# Patient Record
Sex: MALE | Race: WHITE | Employment: OTHER | URBAN - METROPOLITAN AREA
[De-identification: names, ages, dates, MRNs, and addresses within clinical notes are randomized per-mention and may not be internally consistent; named-entity substitution may affect disease eponyms.]

---

## 2017-03-06 ENCOUNTER — OFFICE VISIT (OUTPATIENT)
Dept: PULMONOLOGY | Age: 62
End: 2017-03-06

## 2017-03-06 VITALS
DIASTOLIC BLOOD PRESSURE: 62 MMHG | TEMPERATURE: 97.5 F | HEART RATE: 74 BPM | HEIGHT: 69 IN | WEIGHT: 213.4 LBS | RESPIRATION RATE: 18 BRPM | SYSTOLIC BLOOD PRESSURE: 110 MMHG | BODY MASS INDEX: 31.61 KG/M2 | OXYGEN SATURATION: 95 %

## 2017-03-06 DIAGNOSIS — G47.33 MODERATE OBSTRUCTIVE SLEEP APNEA: Primary | ICD-10-CM

## 2017-03-06 DIAGNOSIS — J44.1 COPD WITH ACUTE EXACERBATION (HCC): ICD-10-CM

## 2017-03-06 DIAGNOSIS — Z12.9 CANCER SCREENING: ICD-10-CM

## 2017-03-06 DIAGNOSIS — J47.9 BRONCHIECTASIS WITHOUT COMPLICATION (HCC): ICD-10-CM

## 2017-03-06 DIAGNOSIS — Z87.891 FORMER SMOKER: ICD-10-CM

## 2017-03-06 PROCEDURE — 99214 OFFICE O/P EST MOD 30 MIN: CPT | Performed by: INTERNAL MEDICINE

## 2017-03-06 RX ORDER — ALBUTEROL SULFATE 90 UG/1
2 AEROSOL, METERED RESPIRATORY (INHALATION) EVERY 6 HOURS PRN
Qty: 1 INHALER | Refills: 6 | Status: SHIPPED | OUTPATIENT
Start: 2017-03-06 | End: 2018-09-27 | Stop reason: SDUPTHER

## 2017-03-06 RX ORDER — PREDNISONE 10 MG/1
TABLET ORAL
Qty: 30 TABLET | Refills: 0 | Status: SHIPPED | OUTPATIENT
Start: 2017-03-06 | End: 2017-03-16

## 2017-03-06 RX ORDER — DOXYCYCLINE HYCLATE 100 MG
100 TABLET ORAL 2 TIMES DAILY
Qty: 10 TABLET | Refills: 0 | Status: SHIPPED | OUTPATIENT
Start: 2017-03-06 | End: 2017-03-07

## 2017-03-06 ASSESSMENT — SLEEP AND FATIGUE QUESTIONNAIRES
NECK CIRCUMFERENCE (INCHES): 17
HOW LIKELY ARE YOU TO NOD OFF OR FALL ASLEEP WHILE SITTING AND TALKING TO SOMEONE: 0
HOW LIKELY ARE YOU TO NOD OFF OR FALL ASLEEP IN A CAR, WHILE STOPPED FOR A FEW MINUTES IN TRAFFIC: 0
ESS TOTAL SCORE: 3
HOW LIKELY ARE YOU TO NOD OFF OR FALL ASLEEP WHILE SITTING QUIETLY AFTER LUNCH WITHOUT ALCOHOL: 0
HOW LIKELY ARE YOU TO NOD OFF OR FALL ASLEEP WHILE LYING DOWN TO REST IN THE AFTERNOON WHEN CIRCUMSTANCES PERMIT: 1
HOW LIKELY ARE YOU TO NOD OFF OR FALL ASLEEP WHILE SITTING AND READING: 1
HOW LIKELY ARE YOU TO NOD OFF OR FALL ASLEEP WHILE SITTING INACTIVE IN A PUBLIC PLACE: 0
HOW LIKELY ARE YOU TO NOD OFF OR FALL ASLEEP WHEN YOU ARE A PASSENGER IN A CAR FOR AN HOUR WITHOUT A BREAK: 0
HOW LIKELY ARE YOU TO NOD OFF OR FALL ASLEEP WHILE WATCHING TV: 1

## 2017-03-07 RX ORDER — AZITHROMYCIN 500 MG/1
500 TABLET, FILM COATED ORAL DAILY
Qty: 5 TABLET | Refills: 0 | Status: SHIPPED | OUTPATIENT
Start: 2017-03-07 | End: 2017-03-12

## 2017-03-09 ENCOUNTER — TELEPHONE (OUTPATIENT)
Dept: PULMONOLOGY | Age: 62
End: 2017-03-09

## 2017-03-10 ENCOUNTER — HOSPITAL ENCOUNTER (OUTPATIENT)
Dept: CT IMAGING | Age: 62
Discharge: HOME | End: 2017-03-10
Attending: INTERNAL MEDICINE | Admitting: INTERNAL MEDICINE

## 2017-03-10 DIAGNOSIS — Z87.891 FORMER SMOKER: ICD-10-CM

## 2017-03-10 DIAGNOSIS — Z12.9 CANCER SCREENING: ICD-10-CM

## 2017-03-10 DIAGNOSIS — Z87.891 PERSONAL HISTORY OF NICOTINE DEPENDENCE: ICD-10-CM

## 2017-03-17 ENCOUNTER — HOSPITAL ENCOUNTER (OUTPATIENT)
Dept: ULTRASOUND IMAGING | Age: 62
Discharge: HOME | End: 2017-03-17
Attending: FAMILY MEDICINE | Admitting: FAMILY MEDICINE

## 2017-03-17 DIAGNOSIS — N28.1 ACQUIRED CYST OF KIDNEY: ICD-10-CM

## 2017-03-30 ENCOUNTER — HOSPITAL ENCOUNTER (OUTPATIENT)
Dept: OTHER | Age: 62
Discharge: HOME | End: 2017-03-30
Attending: INTERNAL MEDICINE | Admitting: INTERNAL MEDICINE

## 2017-03-30 VITALS
DIASTOLIC BLOOD PRESSURE: 68 MMHG | SYSTOLIC BLOOD PRESSURE: 120 MMHG | HEART RATE: 64 BPM | WEIGHT: 207 LBS | OXYGEN SATURATION: 93 % | RESPIRATION RATE: 17 BRPM | BODY MASS INDEX: 30.66 KG/M2 | HEIGHT: 69 IN

## 2017-03-30 RX ORDER — SODIUM CHLORIDE, SODIUM LACTATE, POTASSIUM CHLORIDE, CALCIUM CHLORIDE 600; 310; 30; 20 MG/100ML; MG/100ML; MG/100ML; MG/100ML
INJECTION, SOLUTION INTRAVENOUS ONCE
Status: DISCONTINUED | OUTPATIENT
Start: 2017-03-30 | End: 2017-03-31 | Stop reason: HOSPADM

## 2017-05-03 ENCOUNTER — TELEPHONE (OUTPATIENT)
Dept: PULMONOLOGY | Age: 62
End: 2017-05-03

## 2017-05-08 ENCOUNTER — TELEPHONE (OUTPATIENT)
Dept: CASE MANAGEMENT | Age: 62
End: 2017-05-08

## 2017-07-24 ENCOUNTER — TELEPHONE (OUTPATIENT)
Dept: PULMONOLOGY | Age: 62
End: 2017-07-24

## 2017-07-24 DIAGNOSIS — G47.33 OSA (OBSTRUCTIVE SLEEP APNEA): Primary | ICD-10-CM

## 2017-08-02 ENCOUNTER — TELEPHONE (OUTPATIENT)
Dept: PULMONOLOGY | Age: 62
End: 2017-08-02

## 2017-08-03 ENCOUNTER — OFFICE VISIT (OUTPATIENT)
Dept: PULMONOLOGY | Age: 62
End: 2017-08-03

## 2017-08-03 VITALS
TEMPERATURE: 98 F | SYSTOLIC BLOOD PRESSURE: 116 MMHG | DIASTOLIC BLOOD PRESSURE: 74 MMHG | OXYGEN SATURATION: 92 % | RESPIRATION RATE: 20 BRPM | HEIGHT: 69 IN | BODY MASS INDEX: 30.66 KG/M2 | HEART RATE: 68 BPM | WEIGHT: 207 LBS

## 2017-08-03 DIAGNOSIS — Z87.891 FORMER SMOKER: ICD-10-CM

## 2017-08-03 DIAGNOSIS — J47.9 BRONCHIECTASIS WITHOUT COMPLICATION (HCC): ICD-10-CM

## 2017-08-03 DIAGNOSIS — G47.33 MILD OBSTRUCTIVE SLEEP APNEA: Primary | ICD-10-CM

## 2017-08-03 DIAGNOSIS — J44.9 COPD, MODERATE (HCC): ICD-10-CM

## 2017-08-03 PROCEDURE — 99214 OFFICE O/P EST MOD 30 MIN: CPT | Performed by: INTERNAL MEDICINE

## 2017-08-03 ASSESSMENT — SLEEP AND FATIGUE QUESTIONNAIRES
HOW LIKELY ARE YOU TO NOD OFF OR FALL ASLEEP IN A CAR, WHILE STOPPED FOR A FEW MINUTES IN TRAFFIC: 0
HOW LIKELY ARE YOU TO NOD OFF OR FALL ASLEEP WHILE SITTING INACTIVE IN A PUBLIC PLACE: 0
HOW LIKELY ARE YOU TO NOD OFF OR FALL ASLEEP WHEN YOU ARE A PASSENGER IN A CAR FOR AN HOUR WITHOUT A BREAK: 0
HOW LIKELY ARE YOU TO NOD OFF OR FALL ASLEEP WHILE SITTING QUIETLY AFTER LUNCH WITHOUT ALCOHOL: 0
HOW LIKELY ARE YOU TO NOD OFF OR FALL ASLEEP WHILE LYING DOWN TO REST IN THE AFTERNOON WHEN CIRCUMSTANCES PERMIT: 2
NECK CIRCUMFERENCE (INCHES): 17
HOW LIKELY ARE YOU TO NOD OFF OR FALL ASLEEP WHILE WATCHING TV: 0
HOW LIKELY ARE YOU TO NOD OFF OR FALL ASLEEP WHILE SITTING AND TALKING TO SOMEONE: 0
ESS TOTAL SCORE: 2
HOW LIKELY ARE YOU TO NOD OFF OR FALL ASLEEP WHILE SITTING AND READING: 0

## 2017-09-21 ENCOUNTER — TELEPHONE (OUTPATIENT)
Dept: PULMONOLOGY | Age: 62
End: 2017-09-21

## 2017-11-27 RX ORDER — THEOPHYLLINE ANHYDROUS 300 MG/1
300 CAPSULE, EXTENDED RELEASE ORAL EVERY MORNING
Qty: 90 CAPSULE | Refills: 1 | Status: SHIPPED | OUTPATIENT
Start: 2017-11-27 | End: 2018-05-27 | Stop reason: SDUPTHER

## 2018-03-15 ENCOUNTER — HOSPITAL ENCOUNTER (OUTPATIENT)
Dept: CT IMAGING | Age: 63
Discharge: HOME | End: 2018-03-15
Attending: INTERNAL MEDICINE | Admitting: INTERNAL MEDICINE

## 2018-03-15 ENCOUNTER — TELEPHONE (OUTPATIENT)
Dept: CASE MANAGEMENT | Age: 63
End: 2018-03-15

## 2018-03-15 ENCOUNTER — OFFICE VISIT (OUTPATIENT)
Dept: PULMONOLOGY | Age: 63
End: 2018-03-15

## 2018-03-15 VITALS
RESPIRATION RATE: 20 BRPM | DIASTOLIC BLOOD PRESSURE: 78 MMHG | HEART RATE: 79 BPM | TEMPERATURE: 97 F | SYSTOLIC BLOOD PRESSURE: 124 MMHG | BODY MASS INDEX: 31.7 KG/M2 | HEIGHT: 69 IN | OXYGEN SATURATION: 93 % | WEIGHT: 214 LBS

## 2018-03-15 DIAGNOSIS — G47.33 OSA (OBSTRUCTIVE SLEEP APNEA): ICD-10-CM

## 2018-03-15 DIAGNOSIS — J44.9 MODERATE COPD (CHRONIC OBSTRUCTIVE PULMONARY DISEASE) (HCC): ICD-10-CM

## 2018-03-15 DIAGNOSIS — Z87.891 PERSONAL HISTORY OF TOBACCO USE: Primary | ICD-10-CM

## 2018-03-15 DIAGNOSIS — Z87.891 PERSONAL HISTORY OF TOBACCO USE, PRESENTING HAZARDS TO HEALTH: ICD-10-CM

## 2018-03-15 DIAGNOSIS — J47.9 BRONCHIECTASIS WITHOUT COMPLICATION (HCC): ICD-10-CM

## 2018-03-15 DIAGNOSIS — Z87.891 PERSONAL HISTORY OF NICOTINE DEPENDENCE: ICD-10-CM

## 2018-03-15 PROCEDURE — 99214 OFFICE O/P EST MOD 30 MIN: CPT | Performed by: INTERNAL MEDICINE

## 2018-03-15 PROCEDURE — G0296 VISIT TO DETERM LDCT ELIG: HCPCS | Performed by: INTERNAL MEDICINE

## 2018-03-15 RX ORDER — IPRATROPIUM BROMIDE AND ALBUTEROL SULFATE 2.5; .5 MG/3ML; MG/3ML
3 SOLUTION RESPIRATORY (INHALATION) EVERY 6 HOURS PRN
Qty: 360 ML | Refills: 6 | Status: SHIPPED | OUTPATIENT
Start: 2018-03-15 | End: 2018-09-27 | Stop reason: SDUPTHER

## 2018-03-15 ASSESSMENT — SLEEP AND FATIGUE QUESTIONNAIRES
NECK CIRCUMFERENCE (INCHES): 17
HOW LIKELY ARE YOU TO NOD OFF OR FALL ASLEEP WHILE SITTING AND TALKING TO SOMEONE: 0
HOW LIKELY ARE YOU TO NOD OFF OR FALL ASLEEP WHEN YOU ARE A PASSENGER IN A CAR FOR AN HOUR WITHOUT A BREAK: 0
ESS TOTAL SCORE: 2
HOW LIKELY ARE YOU TO NOD OFF OR FALL ASLEEP WHILE SITTING QUIETLY AFTER LUNCH WITHOUT ALCOHOL: 0
HOW LIKELY ARE YOU TO NOD OFF OR FALL ASLEEP WHILE WATCHING TV: 1
HOW LIKELY ARE YOU TO NOD OFF OR FALL ASLEEP WHILE SITTING INACTIVE IN A PUBLIC PLACE: 0
HOW LIKELY ARE YOU TO NOD OFF OR FALL ASLEEP WHILE LYING DOWN TO REST IN THE AFTERNOON WHEN CIRCUMSTANCES PERMIT: 1
HOW LIKELY ARE YOU TO NOD OFF OR FALL ASLEEP IN A CAR, WHILE STOPPED FOR A FEW MINUTES IN TRAFFIC: 0
HOW LIKELY ARE YOU TO NOD OFF OR FALL ASLEEP WHILE SITTING AND READING: 0

## 2018-03-15 NOTE — PROGRESS NOTES
[facility]                                                            Outpatient Follow Up Note      CHIEF COMPLAINT: Follow up AIDE and COPD         HPI  Patient is using CPAP 8-9   hrs/night. Using humidifier. No snoring on CPAP. The pressure is well tolerated. The mask is comfortable. No significant daytime sleepiness. No nodding off when driving. Bedtime is 11:30 pm and rise time is 7:30 am. Sleep onset is 30minutes. ESS is 2. Patient is compliant with inhaled bronchodilators. Occasional cough with white sputum. No hemoptysis. Past Medical History:   Diagnosis Date    Bronchiectasis ([address]     Bronchitis chronic     Emphysema of lung ([address]     Hypertension     Insomnia     AIDE (obstructive sleep apnea)     Pneumonia        Past Surgical History:        Procedure Laterality Date    COLONOSCOPY  03/30/2017    Normal    MOUTH SURGERY Bilateral        Allergies:  has No Known Allergies. Social History:    TOBACCO:   reports that he quit smoking about 14 years ago. His smoking use included Cigarettes. He has a 52.50 pack-year smoking history. He has never used smokeless tobacco.  ETOH:   reports that he does not drink alcohol.       Family History:       Problem Relation Age of Onset    Emphysema Father     Heart Failure Father     Diabetes Father     Hypertension Father     Asthma Neg Hx     Cancer Neg Hx        Current Medications:    Current Outpatient Prescriptions:     RACHAEL-24 300 MG extended release capsule, TAKE 1 CAPSULE BY MOUTH EVERY MORNING, Disp: 90 capsule, Rfl: 1    fluticasone-salmeterol (ADVAIR DISKUS) 250-50 MCG/DOSE AEPB, Inhale 1 puff into the lungs 2 times daily, Disp: 1 Inhaler, Rfl: 6    albuterol sulfate HFA (VENTOLIN HFA) 108 (90 BASE) MCG/ACT inhaler, Inhale 2 puffs into the lungs every 6 hours as needed for Wheezing or Shortness of Breath, Disp: 1 Inhaler, Rfl: 6    SYNTHROID 137 MCG tablet, , Disp: , Rfl:     Respiratory Therapy Supplies (NEBULIZER/TUBING/MOUTHPIECE) KIT, 1 kit by Does not apply route daily as needed DX COPD J44.9, Disp: 1 kit, Rfl: 11    ipratropium-albuterol (DUONEB) 0.5-2.5 (3) MG/3ML SOLN nebulizer solution, Inhale 3 mLs into the lungs every 6 hours as needed for Shortness of Breath (dyspnea or wheezes. ), Disp: 360 mL, Rfl: 6    fluticasone (FLONASE) 50 MCG/ACT nasal spray, 2 sprays by Nasal route daily. , Disp: 1 Bottle, Rfl: 6    Nebulizers (COMPRESSOR/NEBULIZER) MISC, by Does not apply route. DX COPD Aylin, Disp: 1 each, Rfl: 0    Misc. Devices (ACAPELLA) MISC, by Does not apply route 4 times daily. , Disp: 1 each, Rfl: 0    pravastatin (PRAVACHOL) 40 MG tablet, Take 40 mg by mouth daily. , Disp: , Rfl:     potassium chloride (KLOR-CON) 10 MEQ CR tablet, Take 10 mEq by mouth daily. , Disp: , Rfl:     triamterene-hydrochlorothiazide (MAXZIDE-25) 37.5-25 MG per tablet, Take 1 tablet by mouth daily. , Disp: , Rfl:         Objective:   Blood pressure 124/78, pulse 79, temperature 97 °F (36.1 °C), temperature source Oral, resp. rate 20, height 5' 9\" (1.753 m), weight 214 lb (97.1 kg), SpO2 93 %.' on RA  Gen: No distress. Obese. BMI of 31.60  Eyes: PERRL. No sclera icterus. No conjunctival injection. ENT: No discharge. Pharynx clear. Mallampati class IV. Neck: Trachea midline. No obvious mass. Neck circumference 17\"  Resp: No accessory muscle use. No crackles. No wheezes. No rhonchi. No dullness on percussion. Decreased air entry. CV: Regular rate. Regular rhythm. No murmur or rub. No edema. GI: Non-tender. Non-distended. No hernia. Skin: Warm and dry. No nodule on exposed extremities. Lymph: No cervical LAD. No supraclavicular LAD. M/S: No cyanosis. No joint deformity. No clubbing. Neuro: Awake. Alert. Moves all four extremities. Psych: Oriented x 3. No anxiety.            DATA reviewed by me:   PFTs 08/29/2012 FEV1 2.06L(58%) TLC 8.07L(115%) DLCO 13.56 (44%) 6MW 840 F LO2 84%     PSG AHI 8.3      CPAP data 02/13-03/14 2018 reviewed by me. Uses 8-9  hrs/night with 1100% compliance and AHI of 1.2. CPAP 8 cmH2O       LDCT 3/15/18 imaging reviewed by me and showed  Lung Screening Specific (LungRADS): A few scattered pulmonary nodules measuring up to 2-3 mm. Many of these are unchanged. A few appear new. Potentially Significant Incidentals (LungRADS Category S): Emphysema. Pulmonary incidentals: None Other incidentals: As again noted left renal cyst measuring up to 7.5 by 7.1 cm       Assessment:       · Mild AIDE. CPAP 8 cmH2O with 2LPM O2. Full face mask. Optimal compliance and efficacy upon review today. · Moderate COPD  · Bronchiectasis  · 45 pack year smoking- Quit 2004. No suspicious lesion on LDCT 3/15/2018        Plan:       · Advised to use CPAP 6-8 hrs at night and during naps. · Advised to use O2 with his CPAP- Risks and benefits were discussed with patients    · Replacement of mask, tubing, head straps every 3-6 months or sooner if damaged. · Follow up CPAP compliance and pressure adjustment if needed  · Sleep hygiene  · Avoid sedatives, alcohol and caffeinated drinks at bed time. · No driving motorized vehicles or operating heavy machinery while fatigue, drowsy or sleepy. · Advair 250/50 BID and DuoNeb QID  · Albuterol 2 puffs Q4-6 hrs PRN  · Tristin-24 at 300 mg po qam   · Continue O2 4LPM on exertion- Advised to titrate O2 using her pulse oximeter- target O2 sat 90-92%. · Order for portable concentrator   · Patient is up to date with Pneumococcal vaccine and influenza vaccine. · CT chest, low dose protocol, screening for lung cancer 3/2019. Risks, benefits and alternatives including doing nothing were discussed with patient.

## 2018-03-15 NOTE — PATIENT INSTRUCTIONS
dryer  - Don't use any caustic or household cleaning solutions such as bleach on your CPAP   equipment.  - Do follow the recommended cleaning schedule. - Do change your disposable filter frequently. Adapted From: [email or web address]/cleaning. shtm. These are general suggestions for all models please follow specific s recommendations and specific instructions        What is lung cancer screening? Lung cancer screening is a way in which doctors check the lungs for early signs of cancer in people who have no symptoms of lung cancer. A low-dose CT scan uses much less radiation than a normal CT scan and shows a more detailed image of the lungs than a standard X-ray. The goal of lung cancer screening is to find cancer early, before it has a chance to grow, spread, or cause problems. One large study found that smokers who were screened with low-dose CT scans were less likely to die of lung cancer than those who were screened with standard X-ray. Below is a summary of the things you need to know regarding screening for lung cancer with low-dose computed tomography (LDCT). This is a screening program that involves routine annual screening with LDCT studies of the lung. The LDCTs are done using low-dose radiation that is not thought to increase your cancer risk. If you have other serious medical conditions (other cancers, congestive heart failure) that limit your life expectancy to less than 10 years, you should not undergo lung cancer screening with LDCT. The chance is 20%-60% that the LDCT result will show abnormalities. This would require additional testing which could include repeat imaging or even invasive procedures. Most (about 95%) of \"abnormal\" LDCT results are false in the sense that no lung cancer is ultimately found. Additionally, some (about 10%) of the cancers found would not affect your life expectancy, even if undetected and untreated.   If you are still smoking, the

## 2018-03-16 ENCOUNTER — TELEPHONE (OUTPATIENT)
Dept: CASE MANAGEMENT | Age: 63
End: 2018-03-16

## 2018-05-29 RX ORDER — THEOPHYLLINE ANHYDROUS 300 MG/1
300 CAPSULE, EXTENDED RELEASE ORAL EVERY MORNING
Qty: 90 CAPSULE | Refills: 1 | Status: SHIPPED | OUTPATIENT
Start: 2018-05-29 | End: 2018-09-27 | Stop reason: SDUPTHER

## 2018-09-27 ENCOUNTER — OFFICE VISIT (OUTPATIENT)
Dept: PULMONOLOGY | Age: 63
End: 2018-09-27
Payer: COMMERCIAL

## 2018-09-27 VITALS
HEIGHT: 69 IN | HEART RATE: 59 BPM | SYSTOLIC BLOOD PRESSURE: 151 MMHG | DIASTOLIC BLOOD PRESSURE: 80 MMHG | TEMPERATURE: 97.9 F | BODY MASS INDEX: 30.96 KG/M2 | OXYGEN SATURATION: 91 % | WEIGHT: 209 LBS | RESPIRATION RATE: 16 BRPM

## 2018-09-27 DIAGNOSIS — G47.33 MILD OBSTRUCTIVE SLEEP APNEA: Primary | ICD-10-CM

## 2018-09-27 DIAGNOSIS — Z23 NEED FOR INFLUENZA VACCINATION: ICD-10-CM

## 2018-09-27 DIAGNOSIS — J44.9 MODERATE COPD (CHRONIC OBSTRUCTIVE PULMONARY DISEASE) (HCC): ICD-10-CM

## 2018-09-27 DIAGNOSIS — J47.9 BRONCHIECTASIS WITHOUT COMPLICATION (HCC): ICD-10-CM

## 2018-09-27 PROCEDURE — 99214 OFFICE O/P EST MOD 30 MIN: CPT | Performed by: INTERNAL MEDICINE

## 2018-09-27 PROCEDURE — 90471 IMMUNIZATION ADMIN: CPT | Performed by: INTERNAL MEDICINE

## 2018-09-27 PROCEDURE — 90688 IIV4 VACCINE SPLT 0.5 ML IM: CPT | Performed by: INTERNAL MEDICINE

## 2018-09-27 RX ORDER — IPRATROPIUM BROMIDE AND ALBUTEROL SULFATE 2.5; .5 MG/3ML; MG/3ML
3 SOLUTION RESPIRATORY (INHALATION) EVERY 6 HOURS PRN
Qty: 360 ML | Refills: 6 | Status: SHIPPED | OUTPATIENT
Start: 2018-09-27 | End: 2019-09-26 | Stop reason: SDUPTHER

## 2018-09-27 RX ORDER — ALBUTEROL SULFATE 90 UG/1
2 AEROSOL, METERED RESPIRATORY (INHALATION) EVERY 6 HOURS PRN
Qty: 1 INHALER | Refills: 6 | Status: SHIPPED | OUTPATIENT
Start: 2018-09-27 | End: 2019-03-28 | Stop reason: SDUPTHER

## 2018-09-27 ASSESSMENT — SLEEP AND FATIGUE QUESTIONNAIRES
HOW LIKELY ARE YOU TO NOD OFF OR FALL ASLEEP WHILE SITTING AND READING: 0
ESS TOTAL SCORE: 2
HOW LIKELY ARE YOU TO NOD OFF OR FALL ASLEEP WHILE SITTING QUIETLY AFTER LUNCH WITHOUT ALCOHOL: 0
HOW LIKELY ARE YOU TO NOD OFF OR FALL ASLEEP WHILE WATCHING TV: 0
HOW LIKELY ARE YOU TO NOD OFF OR FALL ASLEEP IN A CAR, WHILE STOPPED FOR A FEW MINUTES IN TRAFFIC: 0
HOW LIKELY ARE YOU TO NOD OFF OR FALL ASLEEP WHILE LYING DOWN TO REST IN THE AFTERNOON WHEN CIRCUMSTANCES PERMIT: 2
HOW LIKELY ARE YOU TO NOD OFF OR FALL ASLEEP WHEN YOU ARE A PASSENGER IN A CAR FOR AN HOUR WITHOUT A BREAK: 0
NECK CIRCUMFERENCE (INCHES): 17
HOW LIKELY ARE YOU TO NOD OFF OR FALL ASLEEP WHILE SITTING INACTIVE IN A PUBLIC PLACE: 0
HOW LIKELY ARE YOU TO NOD OFF OR FALL ASLEEP WHILE SITTING AND TALKING TO SOMEONE: 0

## 2018-09-27 NOTE — PROGRESS NOTES
Vaccine Information Sheet, \"Influenza - Inactivated\"  given to Alex Client, or parent/legal guardian of  Alex Client and verbalized understanding. Patient responses:    Have you ever had a reaction to a flu vaccine? No  Are you able to eat eggs without adverse effects? Yes  Do you have any current illness? No  Have you ever had Guillian [place] Syndrome? No    Flu vaccine given per order. Please see immunization tab.

## 2018-09-27 NOTE — PATIENT INSTRUCTIONS
available. There is no live flu virus in flu shots. They cannot cause the flu. There are many flu viruses, and they are always changing. Each year a new flu vaccine is made to protect against three or four viruses that are likely to cause disease in the upcoming flu season. But even when the vaccine doesnt exactly match these viruses, it may still provide some protection    Flu vaccine cannot prevent:   flu that is caused by a virus not covered by the vaccine, or   illnesses that look like flu but are not. It takes about 2 weeks for protection to develop after vaccination, and protection lasts through the flu season. 3. Some people should not get this vaccine    Tell the person who is giving you the vaccine:     If you have any severe, life-threatening allergies. If you ever had a life-threatening allergic reaction after a dose of flu vaccine, or have a severe allergy to any part of this vaccine, you may be advised not to get vaccinated. Most, but not all, types of flu vaccine contain a small amount of egg protein.  If you ever had Guillain-Barré Syndrome (also called GBS). Some people with a history of GBS should not get this vaccine. This should be discussed with your doctor.  If you are not feeling well. It is usually okay to get flu vaccine when you have a mild illness, but you might be asked to come back when you feel better. 4. Risks of a vaccine reaction    With any medicine, including vaccines, there is a chance of reactions. These are usually mild and go away on their own, but serious reactions are also possible. Most people who get a flu shot do not have any problems with it.      Minor problems following a flu shot include:    soreness, redness, or swelling where the shot was given     hoarseness   sore, red or itchy eyes   cough   fever   aches   headache   itching   fatigue  If these problems occur, they usually begin soon after the shot and last 1 minutes to a few hours after the vaccination. What should I do?  If you think it is a severe allergic reaction or other emergency that cant wait, call 9-1-1 and get the person to the nearest hospital. Otherwise, call your doctor.  Reactions should be reported to the Vaccine Adverse Event Reporting System (VAERS). Your doctor should file this report, or you can do it yourself through  the VAERS web site at www.vaers. [facility].gov, or by calling 7-379.550.8807. VAERS does not give medical advice. 6. The National Vaccine Injury Compensation Program    The [facility] Vaccine Injury Compensation Program (VICP) is a federal program that was created to compensate people who may have been injured by certain vaccines. Persons who believe they may have been injured by a vaccine can learn about the program and about filing a claim by calling 9-303.298.9176 or visiting the [employer] website at [email or web address]. There is a time limit to file a claim for compensation. 7. How can I learn more?  Ask your healthcare provider. He or she can give you the vaccine package insert or suggest other sources of information.  Call your local or state health department.  Contact the Centers for Disease Control and Prevention (CDC):  - Call 6-542.508.7476 (1-800-CDC-INFO) or  - Visit CDCs website at www.cdc.gov/flu    Vaccine Information Statement   Inactivated Influenza Vaccine   8/7/2015  42 URo Leo [id number]    Department of Health and Human Services  Centers for Disease Control and Prevention    Office Use Only      Sleep Hygiene. .. Tips for better sleep. .. Avoid naps. This will ensure you are sleepy at bedtime. If you have to take a nap, sleep less than 1 hour, before 3 pm.  Sleep only when sleepy; this reduces the time you are awake in bed. Regular exercise is recommended to help you deepen your sleep, but not within 4-6 hours of your bedtime.  Timing of exercise is important, aim to exercise early

## 2018-09-27 NOTE — PROGRESS NOTES
SOLN nebulizer solution, Inhale 3 mLs into the lungs every 6 hours as needed for Shortness of Breath (dyspnea or wheezes. ), Disp: 360 mL, Rfl: 6    albuterol sulfate HFA (VENTOLIN HFA) 108 (90 BASE) MCG/ACT inhaler, Inhale 2 puffs into the lungs every 6 hours as needed for Wheezing or Shortness of Breath, Disp: 1 Inhaler, Rfl: 6    SYNTHROID 137 MCG tablet, , Disp: , Rfl:     Respiratory Therapy Supplies (NEBULIZER/TUBING/MOUTHPIECE) KIT, 1 kit by Does not apply route daily as needed DX COPD J44.9, Disp: 1 kit, Rfl: 11    Nebulizers (COMPRESSOR/NEBULIZER) MISC, by Does not apply route. DX COPD Aylin, Disp: 1 each, Rfl: 0    Misc. Devices (ACAPELLA) MISC, by Does not apply route 4 times daily. , Disp: 1 each, Rfl: 0    pravastatin (PRAVACHOL) 40 MG tablet, Take 40 mg by mouth daily. , Disp: , Rfl:     potassium chloride (KLOR-CON) 10 MEQ CR tablet, Take 10 mEq by mouth daily. , Disp: , Rfl:     triamterene-hydrochlorothiazide (MAXZIDE-25) 37.5-25 MG per tablet, Take 1 tablet by mouth daily. , Disp: , Rfl:     fluticasone (FLONASE) 50 MCG/ACT nasal spray, 2 sprays by Nasal route daily. , Disp: 1 Bottle, Rfl: 6        Objective:   Blood pressure (!) 151/80, pulse 59, temperature 97.9 °F (36.6 °C), temperature source Oral, resp. rate 16, height 5' 9\" (1.753 m), weight 209 lb (94.8 kg), SpO2 91 %.' on RA  Gen: No distress. Obese. BMI of 30.86  Eyes: PERRL. No sclera icterus. No conjunctival injection. ENT: No discharge. Pharynx clear. Mallampati class IV. Neck: Trachea midline. No obvious mass. Neck circumference 17\"  Resp: No accessory muscle use. Basilar crackles. No wheezes. No rhonchi. No dullness on percussion. Decreased air entry. CV: Regular rate. Regular rhythm. No murmur or rub. No edema. GI: Non-tender. Non-distended. No hernia. Skin: Warm and dry. No nodule on exposed extremities. Lymph: No cervical LAD. No supraclavicular LAD. M/S: No cyanosis. No joint deformity. No clubbing.    Neuro: Awake. Alert. Moves all four extremities. Psych: Oriented x 3. No anxiety. DATA reviewed by me:   PFTs 08/29/2012 FEV1 2.06L(58%) TLC 8.07L(115%) DLCO 13.56 (44%) 6MW 840 F LO2 84%         LDCT 3/15/18   Lung Screening Specific (LungRADS): A few scattered pulmonary nodules measuring up to 2-3 mm. Many of these are unchanged. A few appear new. Potentially Significant Incidentals (LungRADS Category S): Emphysema. Pulmonary incidentals: None Other incidentals: As again noted left renal cyst measuring up to 7.5 by 7.1 cm     PSG AHI 8.3      CPAP data 02/13-03/14 2018 reviewed by me. Uses 8-9  hrs/night with 100% compliance and AHI of 1.2. CPAP 8 cmH2O   CPAP data 08/28-09/26 2018 reviewed by me. Uses 8-9  hrs/night with 100% compliance and AHI of 1.3. CPAP 8 cmH2O       Assessment:       · Mild AIDE. CPAP 8 cmH2O with 2LPM O2. Full face mask. Optimal compliance and efficacy upon review today. · Moderate COPD  · Bronchiectasis  · 45 pack year smoking- Quit 2004. No suspicious lesion on LDCT 3/15/2018        Plan:       · Check mask for leak today  · Advised to use CPAP 6-8 hrs at night and during naps. · Advised to use O2 with his CPAP- Risks and benefits were discussed with patients    · Replacement of mask, tubing, head straps every 3-6 months or sooner if damaged. · Follow up CPAP compliance and pressure adjustment if needed  · Sleep hygiene  · Avoid sedatives, alcohol and caffeinated drinks at bed time. · No driving motorized vehicles or operating heavy machinery while fatigue, drowsy or sleepy. · Advair 250/50 BID and DuoNeb QID  · Albuterol 2 puffs Q4-6 hrs PRN  · Tristin-24 at 300 mg po qam   · Continue O2 4LPM on exertion- Advised to titrate O2 using her pulse oximeter- target O2 sat 90-92%.      · Patient is up to date with Pneumococcal vaccine  · Influenza vaccine today   · CT chest, low dose protocol, screening for lung cancer 3/2019

## 2018-10-29 ENCOUNTER — TELEPHONE (OUTPATIENT)
Dept: PULMONOLOGY | Age: 63
End: 2018-10-29

## 2018-10-29 DIAGNOSIS — J44.9 COPD, MODERATE (HCC): Primary | ICD-10-CM

## 2018-11-20 ENCOUNTER — OFFICE VISIT (OUTPATIENT)
Dept: PULMONOLOGY | Age: 63
End: 2018-11-20
Payer: COMMERCIAL

## 2018-11-20 ENCOUNTER — HOSPITAL ENCOUNTER (OUTPATIENT)
Dept: PULMONOLOGY | Age: 63
Discharge: HOME | End: 2018-11-20
Payer: MEDICARE

## 2018-11-20 VITALS
OXYGEN SATURATION: 92 % | TEMPERATURE: 97.9 F | HEART RATE: 70 BPM | RESPIRATION RATE: 22 BRPM | BODY MASS INDEX: 31.25 KG/M2 | DIASTOLIC BLOOD PRESSURE: 64 MMHG | WEIGHT: 211 LBS | HEIGHT: 69 IN | SYSTOLIC BLOOD PRESSURE: 126 MMHG

## 2018-11-20 DIAGNOSIS — J47.9 BRONCHIECTASIS WITHOUT COMPLICATION (HCC): ICD-10-CM

## 2018-11-20 DIAGNOSIS — Z99.89 OSA ON CPAP: ICD-10-CM

## 2018-11-20 DIAGNOSIS — R09.02 HYPOXIA: Primary | ICD-10-CM

## 2018-11-20 DIAGNOSIS — G47.33 OSA ON CPAP: ICD-10-CM

## 2018-11-20 DIAGNOSIS — J44.9 MODERATE COPD (CHRONIC OBSTRUCTIVE PULMONARY DISEASE) (HCC): ICD-10-CM

## 2018-11-20 DIAGNOSIS — J44.9 COPD, MODERATE (HCC): ICD-10-CM

## 2018-11-20 PROCEDURE — 94618 PULMONARY STRESS TESTING: CPT

## 2018-11-20 PROCEDURE — 99213 OFFICE O/P EST LOW 20 MIN: CPT | Performed by: NURSE PRACTITIONER

## 2018-11-20 ASSESSMENT — SLEEP AND FATIGUE QUESTIONNAIRES
HOW LIKELY ARE YOU TO NOD OFF OR FALL ASLEEP WHILE SITTING INACTIVE IN A PUBLIC PLACE: 0
NECK CIRCUMFERENCE (INCHES): 16.75
HOW LIKELY ARE YOU TO NOD OFF OR FALL ASLEEP IN A CAR, WHILE STOPPED FOR A FEW MINUTES IN TRAFFIC: 1
HOW LIKELY ARE YOU TO NOD OFF OR FALL ASLEEP WHILE SITTING QUIETLY AFTER LUNCH WITHOUT ALCOHOL: 1
HOW LIKELY ARE YOU TO NOD OFF OR FALL ASLEEP WHEN YOU ARE A PASSENGER IN A CAR FOR AN HOUR WITHOUT A BREAK: 0
HOW LIKELY ARE YOU TO NOD OFF OR FALL ASLEEP WHILE WATCHING TV: 1
HOW LIKELY ARE YOU TO NOD OFF OR FALL ASLEEP WHILE SITTING AND READING: 1
HOW LIKELY ARE YOU TO NOD OFF OR FALL ASLEEP WHILE SITTING AND TALKING TO SOMEONE: 0
HOW LIKELY ARE YOU TO NOD OFF OR FALL ASLEEP WHILE LYING DOWN TO REST IN THE AFTERNOON WHEN CIRCUMSTANCES PERMIT: 2
ESS TOTAL SCORE: 6

## 2018-11-29 ENCOUNTER — TELEPHONE (OUTPATIENT)
Dept: PULMONOLOGY | Age: 63
End: 2018-11-29

## 2018-11-29 DIAGNOSIS — J44.9 COPD, MODERATE (HCC): Primary | ICD-10-CM

## 2019-03-25 ENCOUNTER — HOSPITAL ENCOUNTER (OUTPATIENT)
Dept: CT IMAGING | Age: 64
Discharge: HOME | End: 2019-03-25
Payer: MEDICARE

## 2019-03-25 DIAGNOSIS — Z87.891 HISTORY OF TOBACCO USE: ICD-10-CM

## 2019-03-25 DIAGNOSIS — Z87.891 HISTORY OF TOBACCO USE: Primary | ICD-10-CM

## 2019-03-25 DIAGNOSIS — Z87.891 PERSONAL HISTORY OF NICOTINE DEPENDENCE: ICD-10-CM

## 2019-03-25 PROCEDURE — G0297 LDCT FOR LUNG CA SCREEN: HCPCS

## 2019-03-28 ENCOUNTER — OFFICE VISIT (OUTPATIENT)
Dept: PULMONOLOGY | Age: 64
End: 2019-03-28
Payer: MEDICARE

## 2019-03-28 VITALS
WEIGHT: 214 LBS | DIASTOLIC BLOOD PRESSURE: 66 MMHG | HEART RATE: 67 BPM | TEMPERATURE: 98.4 F | HEIGHT: 69 IN | OXYGEN SATURATION: 90 % | SYSTOLIC BLOOD PRESSURE: 118 MMHG | BODY MASS INDEX: 31.7 KG/M2 | RESPIRATION RATE: 22 BRPM

## 2019-03-28 DIAGNOSIS — J44.9 MODERATE COPD (CHRONIC OBSTRUCTIVE PULMONARY DISEASE) (HCC): ICD-10-CM

## 2019-03-28 DIAGNOSIS — J47.9 BRONCHIECTASIS WITHOUT COMPLICATION (HCC): ICD-10-CM

## 2019-03-28 DIAGNOSIS — Z87.891 PERSONAL HISTORY OF TOBACCO USE: ICD-10-CM

## 2019-03-28 DIAGNOSIS — G47.33 MILD OBSTRUCTIVE SLEEP APNEA: Primary | ICD-10-CM

## 2019-03-28 PROCEDURE — 99214 OFFICE O/P EST MOD 30 MIN: CPT | Performed by: INTERNAL MEDICINE

## 2019-03-28 RX ORDER — ALBUTEROL SULFATE 90 UG/1
2 AEROSOL, METERED RESPIRATORY (INHALATION) EVERY 6 HOURS PRN
Qty: 1 INHALER | Refills: 6 | Status: SHIPPED | OUTPATIENT
Start: 2019-03-28 | End: 2019-09-26 | Stop reason: SDUPTHER

## 2019-03-28 ASSESSMENT — SLEEP AND FATIGUE QUESTIONNAIRES
HOW LIKELY ARE YOU TO NOD OFF OR FALL ASLEEP WHILE SITTING INACTIVE IN A PUBLIC PLACE: 0
HOW LIKELY ARE YOU TO NOD OFF OR FALL ASLEEP WHILE LYING DOWN TO REST IN THE AFTERNOON WHEN CIRCUMSTANCES PERMIT: 1
HOW LIKELY ARE YOU TO NOD OFF OR FALL ASLEEP IN A CAR, WHILE STOPPED FOR A FEW MINUTES IN TRAFFIC: 0
HOW LIKELY ARE YOU TO NOD OFF OR FALL ASLEEP WHILE SITTING AND READING: 0
HOW LIKELY ARE YOU TO NOD OFF OR FALL ASLEEP WHILE SITTING QUIETLY AFTER LUNCH WITHOUT ALCOHOL: 0
NECK CIRCUMFERENCE (INCHES): 16.5
ESS TOTAL SCORE: 2
HOW LIKELY ARE YOU TO NOD OFF OR FALL ASLEEP WHILE WATCHING TV: 1
HOW LIKELY ARE YOU TO NOD OFF OR FALL ASLEEP WHEN YOU ARE A PASSENGER IN A CAR FOR AN HOUR WITHOUT A BREAK: 0
HOW LIKELY ARE YOU TO NOD OFF OR FALL ASLEEP WHILE SITTING AND TALKING TO SOMEONE: 0

## 2019-05-28 RX ORDER — THEOPHYLLINE ANHYDROUS 300 MG/1
CAPSULE, EXTENDED RELEASE ORAL
Qty: 90 CAPSULE | Refills: 1 | OUTPATIENT
Start: 2019-05-28

## 2019-09-26 ENCOUNTER — OFFICE VISIT (OUTPATIENT)
Dept: PULMONOLOGY | Age: 64
End: 2019-09-26
Payer: MEDICARE

## 2019-09-26 VITALS
OXYGEN SATURATION: 94 % | RESPIRATION RATE: 20 BRPM | SYSTOLIC BLOOD PRESSURE: 136 MMHG | HEART RATE: 70 BPM | BODY MASS INDEX: 31.31 KG/M2 | WEIGHT: 212 LBS | DIASTOLIC BLOOD PRESSURE: 58 MMHG

## 2019-09-26 DIAGNOSIS — G47.33 MILD OBSTRUCTIVE SLEEP APNEA: Primary | ICD-10-CM

## 2019-09-26 DIAGNOSIS — Z87.891 PERSONAL HISTORY OF TOBACCO USE: ICD-10-CM

## 2019-09-26 DIAGNOSIS — R09.02 HYPOXIA: ICD-10-CM

## 2019-09-26 DIAGNOSIS — J44.9 MODERATE COPD (CHRONIC OBSTRUCTIVE PULMONARY DISEASE) (HCC): ICD-10-CM

## 2019-09-26 DIAGNOSIS — J47.9 BRONCHIECTASIS WITHOUT COMPLICATION (HCC): ICD-10-CM

## 2019-09-26 PROCEDURE — 99214 OFFICE O/P EST MOD 30 MIN: CPT | Performed by: INTERNAL MEDICINE

## 2019-09-26 RX ORDER — IPRATROPIUM BROMIDE AND ALBUTEROL SULFATE 2.5; .5 MG/3ML; MG/3ML
3 SOLUTION RESPIRATORY (INHALATION) EVERY 6 HOURS PRN
Qty: 360 VIAL | Refills: 1 | Status: SHIPPED | OUTPATIENT
Start: 2019-09-26 | End: 2020-05-27 | Stop reason: SDUPTHER

## 2019-09-26 RX ORDER — ALBUTEROL SULFATE 90 UG/1
2 AEROSOL, METERED RESPIRATORY (INHALATION) EVERY 6 HOURS PRN
Qty: 3 INHALER | Refills: 1 | Status: SHIPPED | OUTPATIENT
Start: 2019-09-26 | End: 2020-01-30 | Stop reason: SDUPTHER

## 2019-09-26 ASSESSMENT — SLEEP AND FATIGUE QUESTIONNAIRES
NECK CIRCUMFERENCE (INCHES): 16.5
HOW LIKELY ARE YOU TO NOD OFF OR FALL ASLEEP IN A CAR, WHILE STOPPED FOR A FEW MINUTES IN TRAFFIC: 0
HOW LIKELY ARE YOU TO NOD OFF OR FALL ASLEEP WHILE SITTING AND TALKING TO SOMEONE: 0
ESS TOTAL SCORE: 1
HOW LIKELY ARE YOU TO NOD OFF OR FALL ASLEEP WHILE SITTING QUIETLY AFTER LUNCH WITHOUT ALCOHOL: 0
HOW LIKELY ARE YOU TO NOD OFF OR FALL ASLEEP WHILE LYING DOWN TO REST IN THE AFTERNOON WHEN CIRCUMSTANCES PERMIT: 1
HOW LIKELY ARE YOU TO NOD OFF OR FALL ASLEEP WHILE SITTING INACTIVE IN A PUBLIC PLACE: 0
HOW LIKELY ARE YOU TO NOD OFF OR FALL ASLEEP WHILE WATCHING TV: 0
HOW LIKELY ARE YOU TO NOD OFF OR FALL ASLEEP WHEN YOU ARE A PASSENGER IN A CAR FOR AN HOUR WITHOUT A BREAK: 0
HOW LIKELY ARE YOU TO NOD OFF OR FALL ASLEEP WHILE SITTING AND READING: 0

## 2019-09-26 NOTE — PROGRESS NOTES
needed for Wheezing or Shortness of Breath, Disp: 1 Inhaler, Rfl: 6    theophylline (RACHAEL-24) 300 MG extended release capsule, Take 1 capsule by mouth every morning, Disp: 90 capsule, Rfl: 1    ipratropium-albuterol (DUONEB) 0.5-2.5 (3) MG/3ML SOLN nebulizer solution, Inhale 3 mLs into the lungs every 6 hours as needed for Shortness of Breath (dyspnea or wheezes. ) DX COPD J44.9, Disp: 360 mL, Rfl: 6    SYNTHROID 137 MCG tablet, , Disp: , Rfl:     Respiratory Therapy Supplies (NEBULIZER/TUBING/MOUTHPIECE) KIT, 1 kit by Does not apply route daily as needed DX COPD J44.9, Disp: 1 kit, Rfl: 11    fluticasone (FLONASE) 50 MCG/ACT nasal spray, 2 sprays by Nasal route daily. , Disp: 1 Bottle, Rfl: 6    Nebulizers (COMPRESSOR/NEBULIZER) MISC, by Does not apply route. DX COPD Aylin, Disp: 1 each, Rfl: 0    Misc. Devices (ACAPELLA) MISC, by Does not apply route 4 times daily. , Disp: 1 each, Rfl: 0    pravastatin (PRAVACHOL) 40 MG tablet, Take 40 mg by mouth daily. , Disp: , Rfl:     potassium chloride (KLOR-CON) 10 MEQ CR tablet, Take 10 mEq by mouth daily. , Disp: , Rfl:     triamterene-hydrochlorothiazide (MAXZIDE-25) 37.5-25 MG per tablet, Take 1 tablet by mouth daily. , Disp: , Rfl:     Respiratory Therapy Supplies (NEBULIZER COMPRESSOR) KIT, 1 kit by Does not apply route once for 1 dose Please supply patient with nebulizer compressor E 0570 + Nebulizer kit  1 x per 6 mo  Patient uses Duoneb in nebulizer  Q 6 hours  NPI: Dr Linda Ag, [id number] [facility], Disp: 1 kit, Rfl: 0        Objective:   Blood pressure (!) 136/58, pulse 70, resp. rate 20, weight 212 lb (96.2 kg), SpO2 94 %.' on RA  Gen: No distress. Obese. BMI of 31.31  Eyes: PERRL. No sclera icterus. No conjunctival injection. ENT: No discharge. Pharynx clear. Mallampati class IV. Neck: Trachea midline. No obvious mass. Neck circumference 16.5\"  Resp: No accessory muscle use. Minimal crackles. Few wheezes. No rhonchi.  No dullness on percussion. Decreased air entry. CV: Regular rate. Regular rhythm. No murmur or rub. No edema. GI: Non-tender. Non-distended. No hernia. Skin: Warm and dry. No nodule on exposed extremities. Lymph: No cervical LAD. No supraclavicular LAD. M/S: No cyanosis. No joint deformity. No clubbing. Neuro: Awake. Alert. Moves all four extremities. Psych: Oriented x 3. No anxiety. DATA reviewed by me:   PFTs 08/29/2012 FEV1 2.06L(58%) TLC 8.07L(115%) DLCO 13.56 (44%) 6MW 840 F LO2 84%         CT chest 3/25/19   Small tiny nodules bilaterally, measuring less than 3 mm, stable from prior CT  No new nodules  Advanced emphysema    PSG AHI 8.3      CPAP data 02/13-03/14 2018 reviewed by me. Uses 8-9  hrs/night with 100% compliance and AHI of 1.2. CPAP 8 cmH2O   CPAP data 08/28-09/26 2018 reviewed by me. Uses 8-9  hrs/night with 100% compliance and AHI of 1.3. CPAP 8 cmH2O   CPAP data 08/27-09/25 2019 reviewed by me. Uses 4-5  hrs/night with 60% compliance and AHI of 0.9. CPAP 8 cmH2O     Assessment:       · Mild AIDE. CPAP 8 cmH2O with 2LPM O2. Full face mask. Suboptimal compliance upon review today. · Large mask leak  · Moderate COPD  · Hypoxia on exertion   · Bronchiectasis  · 45 pack year smoking- Quit 2004. No suspicious lesion on LDCT 3/15/2018        Plan:       · Mask fitting today   · Continue CPAP 6-8 hrs at night and during naps. · Continue O2 with his CPAP- Risks and benefits were discussed with patients    · Replacement of mask, tubing, head straps every 3-6 months or sooner if damaged. · Follow up CPAP compliance and pressure adjustment if needed  · Sleep hygiene  · Avoid sedatives, alcohol and caffeinated drinks at bed time. · No driving motorized vehicles or operating heavy machinery while fatigue, drowsy or sleepy.    · Refill on Advair 250/50 BID  · DuoNeb QID and Albuterol 2 puffs Q4-6 hrs PRN  · Tristin-24 at 300 mg po qam   · 6MW  · ONPO on CPAP   · Continue O2 4LPM on exertion- Advised

## 2019-10-01 ENCOUNTER — HOSPITAL ENCOUNTER (OUTPATIENT)
Dept: PULMONOLOGY | Age: 64
Discharge: HOME | End: 2019-10-01
Payer: MEDICARE

## 2019-10-01 DIAGNOSIS — R09.02 HYPOXIA: ICD-10-CM

## 2019-10-01 DIAGNOSIS — J47.9 BRONCHIECTASIS WITHOUT COMPLICATION (HCC): ICD-10-CM

## 2019-10-01 DIAGNOSIS — J44.9 MODERATE COPD (CHRONIC OBSTRUCTIVE PULMONARY DISEASE) (HCC): ICD-10-CM

## 2019-10-01 PROCEDURE — 94618 PULMONARY STRESS TESTING: CPT

## 2020-01-30 RX ORDER — ALBUTEROL SULFATE 90 UG/1
2 AEROSOL, METERED RESPIRATORY (INHALATION) EVERY 6 HOURS PRN
Qty: 3 INHALER | Refills: 1 | Status: SHIPPED | OUTPATIENT
Start: 2020-01-30 | End: 2020-05-27 | Stop reason: SDUPTHER

## 2020-02-17 ENCOUNTER — TELEPHONE (OUTPATIENT)
Dept: PULMONOLOGY | Age: 65
End: 2020-02-17

## 2020-02-17 NOTE — TELEPHONE ENCOUNTER
Patient cancelled appointment on 3/30/20 with Rubi Bernardo for ct lung screen results. Reason: unable to pay copy    Patient did reschedule appointment. Appointment rescheduled for 7/29/20. Ct lung screen is still pal for 3/25/20. Last OV 9/26/19      Assessment:       · Mild AIDE. CPAP 8 cmH2O with 2LPM O2. Full face mask. Suboptimal compliance upon review today. · Large mask leak  · Moderate COPD  · Hypoxia on exertion   · Bronchiectasis  · 45 pack year smoking- Quit 2004. No suspicious lesion on LDCT 3/15/2018                  Plan:       · Mask fitting today   · Continue CPAP 6-8 hrs at night and during naps. · Continue O2 with his CPAP- Risks and benefits were discussed with patients    · Replacement of mask, tubing, head straps every 3-6 months or sooner if damaged. · Follow up CPAP compliance and pressure adjustment if needed  · Sleep hygiene  · Avoid sedatives, alcohol and caffeinated drinks at bed time. · No driving motorized vehicles or operating heavy machinery while fatigue, drowsy or sleepy. · Refill on Advair 250/50 BID  · DuoNeb QID and Albuterol 2 puffs Q4-6 hrs PRN  · Tristin-24 at 300 mg po qam   · 6MW  · ONPO on CPAP   · Continue O2 4LPM on exertion- Advised to titrate O2 using her pulse oximeter- target O2 sat 90-92%.      · Patient is up to date with Pneumococcal vaccine and influenza vaccine   · CT chest, low dose protocol, screening for lung cancer 3/2020

## 2020-04-27 RX ORDER — THEOPHYLLINE ANHYDROUS 300 MG/1
CAPSULE, EXTENDED RELEASE ORAL
Qty: 90 CAPSULE | Refills: 1 | Status: SHIPPED | OUTPATIENT
Start: 2020-04-27 | End: 2020-10-28

## 2020-05-26 ENCOUNTER — HOSPITAL ENCOUNTER (OUTPATIENT)
Dept: CT IMAGING | Age: 65
Discharge: HOME | End: 2020-05-26
Payer: MEDICARE

## 2020-05-26 ENCOUNTER — TELEPHONE (OUTPATIENT)
Dept: PULMONOLOGY | Age: 65
End: 2020-05-26

## 2020-05-26 PROCEDURE — G0297 LDCT FOR LUNG CA SCREEN: HCPCS

## 2020-05-26 NOTE — TELEPHONE ENCOUNTER
the provision of medical care and treatment via Telehealth and the Service and you may not be able to receive diagnosis and/or treatment through the Service for every condition for which you seek diagnosis and/or treatment. 11. There are potential risks to the use of Telehealth, including but not limited to the risks described in this Telehealth Consent. 12. Your Provider(s) have discussed the use of Telehealth and the Service with you, including the benefits and risks of such and you have provided oral consent to your Provider(s) for the use of Telehealth and the Service. 15. You understand that it is your duty to provide your Provider(s) truthful, accurate and complete information, including all relevant information regarding care that you may have received or may be receiving from other healthcare providers outside of the Service. 14. You understand that each of your Provider(s) may determine in his or sole discretion that your condition is not suitable for diagnosis and/or treatment using the Service, and that you may need to seek medical care and treatment a specialist or other healthcare provider, outside of the Service. 15. You understand that you are fully responsible for payment for all services provided by Provider(s) or through use of the Service and that you may not be able to use third-party insurance. 16. You represent that (a) you have read this Telehealth Consent carefully, (b) you understand the risks and benefits of the Service and the use of Telehealth in the medical care and treatment provided to you by Provider(s) using the Service, and (c) you have the legal capacity and authority to provide this consent for yourself and/or the minor for which you are consenting under applicable federal and state laws, including laws relating to the age of [de-identified] and/or parental/guardian consent.    17. You give your informed consent to the use of Telehealth by Provider(s) using the Service under the terms described in the Terms of Service and this Telehealth Consent. The patient was read the following statement and has consented to the visit as of 5/26/20. The patient has been scheduled for their first telehealth visit on 5/27/20 with .

## 2020-05-27 ENCOUNTER — VIRTUAL VISIT (OUTPATIENT)
Dept: PULMONOLOGY | Age: 65
End: 2020-05-27
Payer: MEDICARE

## 2020-05-27 VITALS — BODY MASS INDEX: 31.84 KG/M2 | WEIGHT: 215 LBS | HEIGHT: 69 IN | OXYGEN SATURATION: 93 % | HEART RATE: 80 BPM

## 2020-05-27 PROCEDURE — 99214 OFFICE O/P EST MOD 30 MIN: CPT | Performed by: INTERNAL MEDICINE

## 2020-05-27 RX ORDER — IPRATROPIUM BROMIDE AND ALBUTEROL SULFATE 2.5; .5 MG/3ML; MG/3ML
3 SOLUTION RESPIRATORY (INHALATION) EVERY 6 HOURS PRN
Qty: 360 VIAL | Refills: 1 | Status: SHIPPED | OUTPATIENT
Start: 2020-05-27 | End: 2020-07-23

## 2020-05-27 RX ORDER — ALBUTEROL SULFATE 90 UG/1
2 AEROSOL, METERED RESPIRATORY (INHALATION) EVERY 6 HOURS PRN
Qty: 3 INHALER | Refills: 1 | Status: SHIPPED | OUTPATIENT
Start: 2020-05-27 | End: 2020-12-22

## 2020-05-27 ASSESSMENT — SLEEP AND FATIGUE QUESTIONNAIRES
HOW LIKELY ARE YOU TO NOD OFF OR FALL ASLEEP WHILE SITTING AND READING: 0
ESS TOTAL SCORE: 3
HOW LIKELY ARE YOU TO NOD OFF OR FALL ASLEEP WHILE SITTING AND TALKING TO SOMEONE: 0
HOW LIKELY ARE YOU TO NOD OFF OR FALL ASLEEP WHILE LYING DOWN TO REST IN THE AFTERNOON WHEN CIRCUMSTANCES PERMIT: 2
HOW LIKELY ARE YOU TO NOD OFF OR FALL ASLEEP IN A CAR, WHILE STOPPED FOR A FEW MINUTES IN TRAFFIC: 0
HOW LIKELY ARE YOU TO NOD OFF OR FALL ASLEEP WHILE SITTING INACTIVE IN A PUBLIC PLACE: 0
HOW LIKELY ARE YOU TO NOD OFF OR FALL ASLEEP WHEN YOU ARE A PASSENGER IN A CAR FOR AN HOUR WITHOUT A BREAK: 0
HOW LIKELY ARE YOU TO NOD OFF OR FALL ASLEEP WHILE WATCHING TV: 1
HOW LIKELY ARE YOU TO NOD OFF OR FALL ASLEEP WHILE SITTING QUIETLY AFTER LUNCH WITHOUT ALCOHOL: 0

## 2020-05-27 NOTE — PROGRESS NOTES
[facility]                                                            Outpatient Follow Up Note  TELEHEALTH EVALUATION: Service performed was Audio/Visual ([id number]) and not a face-to-face visit       CHIEF COMPLAINT: Follow up CT chest         HPI  CT chest reviewed by me and noted below. Results were dicussed with patient and multiple good questions were answered. Not doing too bad  No cough or sputum  White sputum  No hemoptysis   Uses Neb 2 times/week   No smoking   Patient is using CPAP 4-5   hrs/night. Using humidifier. No snoring on CPAP. The pressure is well tolerated. The mask is comfortable. No significant daytime sleepiness. No nodding off when driving. Bedtime is 11:30 pm and rise time is 8 am. Sleep onset is 30 minutes. ESS is 3    Not using his O2 with CPAP due to finance- not able to afford electric bill   Uses O2 PRN during the day on and off   Risks and benefits were discussed with patients        Past Medical History:   Diagnosis Date    Bronchiectasis ([address]     Bronchitis chronic     Emphysema of lung ([address]     Hypertension     Insomnia     AIDE (obstructive sleep apnea)     Pneumonia        Past Surgical History:        Procedure Laterality Date    COLONOSCOPY  03/30/2017    Normal    MOUTH SURGERY Bilateral        Allergies:  has No Known Allergies. Social History:    TOBACCO:   reports that he quit smoking about 16 years ago. His smoking use included cigarettes. He has a 52.50 pack-year smoking history. He has never used smokeless tobacco.  ETOH:   reports no history of alcohol use.       Family History:       Problem Relation Age of Onset    Emphysema Father     Heart Failure Father     Diabetes Father     Hypertension Father     Asthma Neg Hx     Cancer Neg Hx        Current Medications:    Current Outpatient Medications:     RACHAEL-24 300 MG extended release capsule, TAKE CPAP 8 cmH2O with 2LPM O2. Full face mask. Refusing O2 with CPAP   · Moderate COPD  · Chronic hypoxemic respiratory failure- 1 L at rest, 6 L exertion, 2 L at night with CPAP  · Bronchiectasis  · 45 pack year smoking- Quit 2004. No suspicious lesion on LDCT 3/15/2018        Plan:       · Options of Bx, resection and watchful waiting were discussed with patient. I recommend radiographic follow up CT chest in 6 months  · Continue CPAP 6-8 hrs at night and during naps. · Continue O2 with his CPAP- Risks and benefits were discussed with patients    · Replacement of mask, tubing, head straps every 3-6 months or sooner if damaged. · Follow up CPAP compliance and pressure adjustment if needed  · Sleep hygiene  · Avoid sedatives, alcohol and caffeinated drinks at bed time. · No driving motorized vehicles or operating heavy machinery while fatigue, drowsy or sleepy. · Continue Advair 250/50 BID and DuoNeb QID and Albuterol 2 puffs Q4-6 hrs PRN  · Tristin-24 at 300 mg po qam   · Advised to use O2 1 L at rest and 6LPM on exertion- Advised to titrate O2 using her pulse oximeter- target O2 sat 90-92%. Risks and benefits were discussed with patients    · Patient is up to date with Pneumococcal vaccine and influenza vaccine              Armida Dos Santos is a 76 y.o. male being evaluated by a Virtual Visit (video visit) encounter to address concerns as mentioned above. A caregiver was present when appropriate. Due to this being a TeleHealth encounter (During [place] [id number] public health emergency), evaluation of the following organ systems was limited: Vitals/Constitutional/EENT/Resp/CV/GI//MS/Neuro/Skin/Heme-Lymph-Imm.   Pursuant to the emergency declaration under the [address] [facility], [facility] authority and the [employer] Act, this Virtual Visit was conducted with patient's (and/or legal guardian's) consent, to reduce the patient's risk of

## 2020-07-23 RX ORDER — IPRATROPIUM BROMIDE AND ALBUTEROL SULFATE 2.5; .5 MG/3ML; MG/3ML
SOLUTION RESPIRATORY (INHALATION)
Qty: 360 ML | Refills: 5 | Status: SHIPPED | OUTPATIENT
Start: 2020-07-23 | End: 2022-01-03

## 2020-08-13 ENCOUNTER — TELEPHONE (OUTPATIENT)
Dept: PULMONOLOGY | Age: 65
End: 2020-08-13

## 2020-09-14 ENCOUNTER — TELEPHONE (OUTPATIENT)
Dept: PULMONOLOGY | Age: 65
End: 2020-09-14

## 2020-10-28 RX ORDER — THEOPHYLLINE ANHYDROUS 300 MG/1
CAPSULE, EXTENDED RELEASE ORAL
Qty: 90 CAPSULE | Refills: 1 | Status: SHIPPED | OUTPATIENT
Start: 2020-10-28 | End: 2021-03-03 | Stop reason: RX

## 2020-11-27 ENCOUNTER — HOSPITAL ENCOUNTER (OUTPATIENT)
Dept: CT IMAGING | Age: 65
Discharge: HOME | End: 2020-11-27
Payer: MEDICARE

## 2020-11-27 PROCEDURE — 71250 CT THORAX DX C-: CPT

## 2020-12-07 ENCOUNTER — VIRTUAL VISIT (OUTPATIENT)
Dept: PULMONOLOGY | Age: 65
End: 2020-12-07
Payer: MEDICARE

## 2020-12-07 PROCEDURE — 99443 PR PHYS/QHP TELEPHONE EVALUATION 21-30 MIN: CPT | Performed by: INTERNAL MEDICINE

## 2020-12-07 ASSESSMENT — SLEEP AND FATIGUE QUESTIONNAIRES
HOW LIKELY ARE YOU TO NOD OFF OR FALL ASLEEP WHILE WATCHING TV: 1
HOW LIKELY ARE YOU TO NOD OFF OR FALL ASLEEP WHILE SITTING AND READING: 0
ESS TOTAL SCORE: 2
HOW LIKELY ARE YOU TO NOD OFF OR FALL ASLEEP WHILE SITTING AND TALKING TO SOMEONE: 0
HOW LIKELY ARE YOU TO NOD OFF OR FALL ASLEEP WHEN YOU ARE A PASSENGER IN A CAR FOR AN HOUR WITHOUT A BREAK: 0
HOW LIKELY ARE YOU TO NOD OFF OR FALL ASLEEP IN A CAR, WHILE STOPPED FOR A FEW MINUTES IN TRAFFIC: 0
HOW LIKELY ARE YOU TO NOD OFF OR FALL ASLEEP WHILE SITTING INACTIVE IN A PUBLIC PLACE: 0
HOW LIKELY ARE YOU TO NOD OFF OR FALL ASLEEP WHILE SITTING QUIETLY AFTER LUNCH WITHOUT ALCOHOL: 0
HOW LIKELY ARE YOU TO NOD OFF OR FALL ASLEEP WHILE LYING DOWN TO REST IN THE AFTERNOON WHEN CIRCUMSTANCES PERMIT: 1

## 2020-12-07 NOTE — PROGRESS NOTES
Inhaler, Rfl: 1    RACHAEL-24 300 MG extended release capsule, TAKE 1 CAPSULE BY MOUTH EVERY DAY IN THE MORNING, Disp: 90 capsule, Rfl: 1    Fluticasone furoate-vilanterol (BREO ELLIPTA) 200-25 MCG/INH AEPB inhaler, Inhale 1 puff into the lungs daily (Patient not taking: Reported on 12/7/2020), Disp: 30 each, Rfl: 6    ipratropium-albuterol (DUONEB) 0.5-2.5 (3) MG/3ML SOLN nebulizer solution, INHALE 3 MLS INTO THE LUNGS EVERY 6 HOURS AS NEEDED FOR SHORTNESS OF BREATH (DYSPNEA OR WHEEZES), Disp: 360 mL, Rfl: 5    Respiratory Therapy Supplies (NEBULIZER COMPRESSOR) KIT, 1 kit by Does not apply route once for 1 dose Please supply patient with nebulizer compressor E 0570 + Nebulizer kit  1 x per 6 mo  Patient uses Duoneb in nebulizer  Q 6 hours  NPI: Dr Carissa Figueroa, [id number] DME [facility], Disp: 1 kit, Rfl: 0    SYNTHROID 137 MCG tablet, Take 137 mcg by mouth Daily , Disp: , Rfl:     Respiratory Therapy Supplies (NEBULIZER/TUBING/MOUTHPIECE) KIT, 1 kit by Does not apply route daily as needed DX COPD J44.9, Disp: 1 kit, Rfl: 11    Nebulizers (COMPRESSOR/NEBULIZER) MISC, by Does not apply route. DX COPD Aylin, Disp: 1 each, Rfl: 0    Misc. Devices (ACAPELLA) MISC, by Does not apply route 4 times daily. , Disp: 1 each, Rfl: 0    pravastatin (PRAVACHOL) 40 MG tablet, Take 40 mg by mouth daily. , Disp: , Rfl:     potassium chloride (KLOR-CON) 10 MEQ CR tablet, Take 10 mEq by mouth daily. , Disp: , Rfl:     triamterene-hydrochlorothiazide (MAXZIDE-25) 37.5-25 MG per tablet, Take 1 tablet by mouth daily. , Disp: , Rfl:         Objective:   Telephone visit not able to obtain physical exam               DATA reviewed by me:   PFTs 08/29/2012 FEV1 2.06L(58%) TLC 8.07L(115%) DLCO 13.56 (44%) 6MW 840 F LO2 84%     6-minute walk 10/1/2019 1 L at rest and 6 L on exertion    CT chest 5/26/2020 imaging reviewed by me and showed  0.9 x 0.5 cm partially solid nodular opacity in the left upper lobe which  appears new.   Solid component measures 5 mm. A few nodules in the left lower  lobe measuring up to 4 mm, unchanged. Potentially Significant Incidentals (LungRADS Category S): Emphysema. Pulmonary incidentals:  Mild bronchial wall thickening in the mid and lower  lungs. Other incidentals:  None      CT chest 11/27/2020 imaging reviewed by me and showed  Regression of KARIME nodule  No findings worrisome for malignancy  Severe emphysema  No acute abnormalities    PSG AHI 8.3    Overnight pulse oximeter 9/26/2019 on CPAP 8 cm H2O with significant desaturation 6 hours and 56 minutes below 88%          CPAP data 02/13-03/14 2018 reviewed by me. Uses 8-9  hrs/night with 100% compliance and AHI of 1.2. CPAP 8 cmH2O   CPAP data 08/28-09/26 2018 reviewed by me. Uses 8-9  hrs/night with 100% compliance and AHI of 1.3. CPAP 8 cmH2O   CPAP data 08/27-09/25 2019 reviewed by me. Uses 4-5  hrs/night with 60% compliance and AHI of 0.9. CPAP 8 cmH2O     Assessment:       · New KARIME 9 mm nodule on CT 5/26/2020. Regressed on repeat CT 11/27/2020. · Mild AIDE. CPAP 8 cmH2O with 2LPM O2. Full face mask. Refusing O2 with CPAP. Optimal compliance per patient's report  · Moderate COPD  · Chronic hypoxemic respiratory failure- 1 L at rest, 6 L exertion, 2 L at night with CPAP  · Bronchiectasis  · 45 pack year smoking- Quit 2004. Plan:       · Continue CPAP 6-8 hrs at night and during naps. · Advised to use O2 with his CPAP  · Replacement of mask, tubing, head straps every 3-6 months or sooner if damaged. · Follow up CPAP compliance and pressure adjustment if needed  · Sleep hygiene  · Avoid sedatives, alcohol and caffeinated drinks at bed time. · No driving motorized vehicles or operating heavy machinery while fatigue, drowsy or sleepy.    · Continue Advair 250/50 BID and DuoNeb QID   · Continue Albuterol 2 puffs Q4-6 hrs PRN  · Continue Tristin-24 at 300 mg po qam   · Advised to use O2 1 L at rest and 6LPM on exertion- Advised to titrate O2 using her pulse oximeter- target O2 sat 90-92%. Risks and benefits were discussed with patients    · Patient is up to date with Pneumococcal vaccine and influenza vaccine   · Follow up in 6 month           Katherine Reagan is a 76 y.o. male evaluated via telephone on 12/7/2020. Consent:  He and/or health care decision maker is aware that that he may receive a bill for this telephone service, depending on his insurance coverage, and has provided verbal consent to proceed: Yes       Documentation:  I communicated with the patient and/or health care decision maker about: See above   Details of this discussion including any medical advice provided: See above       I Affirm this is a Patient Initiated Episode with an Established Patient who has not had a related appointment within my department in the past 7 days or scheduled within the next 24 hours.     Total Time: 21-30 minutes     Note: not billable if this call serves to triage the patient into an appointment for the relevant concern      Nely Phan

## 2020-12-22 RX ORDER — ALBUTEROL SULFATE 90 UG/1
2 AEROSOL, METERED RESPIRATORY (INHALATION) EVERY 6 HOURS PRN
Qty: 54 INHALER | Refills: 1 | Status: SHIPPED | OUTPATIENT
Start: 2020-12-22 | End: 2021-05-03

## 2021-02-22 ENCOUNTER — TELEPHONE (OUTPATIENT)
Dept: PULMONOLOGY | Age: 66
End: 2021-02-22

## 2021-02-22 DIAGNOSIS — G47.33 OSA (OBSTRUCTIVE SLEEP APNEA): Primary | ICD-10-CM

## 2021-03-03 NOTE — TELEPHONE ENCOUNTER
Patient requesting refill on Tristin-24. Med pending [facility]. States that [facility] never received the RX that was sent in on 10/28/20. I called [facility] spoke with Milton the pharmacist he states they do not have that request. Med pending. LOV: 12/7/20    Assessment:       · New KARIME 9 mm nodule on CT 5/26/2020. Regressed on repeat CT 11/27/2020. · Mild AIDE. CPAP 8 cmH2O with 2LPM O2. Full face mask. Refusing O2 with CPAP. Optimal compliance per patient's report  · Moderate COPD  · Chronic hypoxemic respiratory failure- 1 L at rest, 6 L exertion, 2 L at night with CPAP  · Bronchiectasis  · 45 pack year smoking- Quit 2004. Plan:       · Continue CPAP 6-8 hrs at night and during naps. · Advised to use O2 with his CPAP  · Replacement of mask, tubing, head straps every 3-6 months or sooner if damaged. · Follow up CPAP compliance and pressure adjustment if needed  · Sleep hygiene  · Avoid sedatives, alcohol and caffeinated drinks at bed time. · No driving motorized vehicles or operating heavy machinery while fatigue, drowsy or sleepy. · Continue Advair 250/50 BID and DuoNeb QID   · Continue Albuterol 2 puffs Q4-6 hrs PRN  · Continue Tristin-24 at 300 mg po qam   · Advised to use O2 1 L at rest and 6LPM on exertion- Advised to titrate O2 using her pulse oximeter- target O2 sat 90-92%.  Risks and benefits were discussed with patients    · Patient is up to date with Pneumococcal vaccine and influenza vaccine   · Follow up in 6 month

## 2021-03-15 ENCOUNTER — TELEPHONE (OUTPATIENT)
Dept: PULMONOLOGY | Age: 66
End: 2021-03-15

## 2021-03-15 NOTE — TELEPHONE ENCOUNTER
Patient states his theophylline needs a PA. His insurance has changed he now has [employer] I asked that he send us a picture of his insurance card through [employer] as we do not have the correct insurance on file. Watch for insurance will submit PA after    LOV: 12/7/20    Assessment:       · New KARIME 9 mm nodule on CT 5/26/2020. Regressed on repeat CT 11/27/2020. · Mild AIDE. CPAP 8 cmH2O with 2LPM O2. Full face mask. Refusing O2 with CPAP. Optimal compliance per patient's report  · Moderate COPD  · Chronic hypoxemic respiratory failure- 1 L at rest, 6 L exertion, 2 L at night with CPAP  · Bronchiectasis  · 45 pack year smoking- Quit 2004. Plan:       · Continue CPAP 6-8 hrs at night and during naps. · Advised to use O2 with his CPAP  · Replacement of mask, tubing, head straps every 3-6 months or sooner if damaged. · Follow up CPAP compliance and pressure adjustment if needed  · Sleep hygiene  · Avoid sedatives, alcohol and caffeinated drinks at bed time. · No driving motorized vehicles or operating heavy machinery while fatigue, drowsy or sleepy. · Continue Advair 250/50 BID and DuoNeb QID   · Continue Albuterol 2 puffs Q4-6 hrs PRN  · Continue Tristin-24 at 300 mg po qam   · Advised to use O2 1 L at rest and 6LPM on exertion- Advised to titrate O2 using her pulse oximeter- target O2 sat 90-92%.  Risks and benefits were discussed with patients    · Patient is up to date with Pneumococcal vaccine and influenza vaccine   · Follow up in 6 month

## 2021-04-19 DIAGNOSIS — J44.9 MODERATE COPD (CHRONIC OBSTRUCTIVE PULMONARY DISEASE) (HCC): Primary | ICD-10-CM

## 2021-04-19 DIAGNOSIS — Z51.81 THERAPEUTIC DRUG MONITORING: ICD-10-CM

## 2021-04-19 NOTE — TELEPHONE ENCOUNTER
Called [facility] with [facility] completed by phone pending review they will fax approval or denial in 71hr. [facility]#[id number].

## 2021-04-22 NOTE — TELEPHONE ENCOUNTER
Ok for theophylline extended release 24 hours tablets, 400 mg daily   Check theophylline level in 3 to 5 days

## 2021-04-22 NOTE — TELEPHONE ENCOUNTER
Patient informed. States he has a lab appt with Dr. Noel Bolaños next week he will have lab drawn there. Will need to fax once signed. Med pending.

## 2021-04-22 NOTE — TELEPHONE ENCOUNTER
Appears Tristin-24 300 mg daily first added to patient regimen on 5/11/15. Per outside dispense history, patient last filled 7/29/20 for 90ds at [facility]. When refill was sent in Oct 2020, the pharmacy states they never received it. Insurance has changed from [place] to [employer] [facility]. PA denied due to patient needing to try generic theophylline extended-release 12 hour tablet, generic theophylline extended release 24 hour table, theophylline solution, or aminophylline solution first.    Generic theophylline extended release 24 hour tablets only come in 400 mg and 600 mg strengths.     -Could try sending a statement saying patient cannot take the formulary alternative (generic theophylline) due to not being available in the patient's daily dose of 300 mg.     -Could send in new Rx for generic theophylline extended release 24 hour tablet 400 mg dose which should be an approved formulary alternative if dose increase is appropriate for this patient.

## 2021-05-03 RX ORDER — ALBUTEROL SULFATE 90 UG/1
2 AEROSOL, METERED RESPIRATORY (INHALATION) EVERY 6 HOURS PRN
Qty: 54 INHALER | Refills: 1 | Status: SHIPPED | OUTPATIENT
Start: 2021-05-03 | End: 2022-01-06 | Stop reason: SDUPTHER

## 2021-05-03 NOTE — TELEPHONE ENCOUNTER
Patient called stating that he is not able to  Tristin-24. Called and spoke with [facility] pharmacy they state medication is not covered that is non formulary. Please advise. Assessment: 12/7/20       · New KARIME 9 mm nodule on CT 5/26/2020. Regressed on repeat CT 11/27/2020. · Mild AIDE. CPAP 8 cmH2O with 2LPM O2. Full face mask. Refusing O2 with CPAP. Optimal compliance per patient's report  · Moderate COPD  · Chronic hypoxemic respiratory failure- 1 L at rest, 6 L exertion, 2 L at night with CPAP  · Bronchiectasis  · 45 pack year smoking- Quit 2004. Plan:       · Continue CPAP 6-8 hrs at night and during naps. · Advised to use O2 with his CPAP  · Replacement of mask, tubing, head straps every 3-6 months or sooner if damaged. · Follow up CPAP compliance and pressure adjustment if needed  · Sleep hygiene  · Avoid sedatives, alcohol and caffeinated drinks at bed time. · No driving motorized vehicles or operating heavy machinery while fatigue, drowsy or sleepy. · Continue Advair 250/50 BID and DuoNeb QID   · Continue Albuterol 2 puffs Q4-6 hrs PRN  · Continue Tristin-24 at 300 mg po qam   · Advised to use O2 1 L at rest and 6LPM on exertion- Advised to titrate O2 using her pulse oximeter- target O2 sat 90-92%.  Risks and benefits were discussed with patients    · Patient is up to date with Pneumococcal vaccine and influenza vaccine   · Follow up in 6 month

## 2021-05-05 NOTE — TELEPHONE ENCOUNTER
Renita Moreira MD  to [facility]        5/3/21 12:45 PM  Note     D/C Tristin 25         Sherel Lei, [address] [place]         5/3/21 3:53 PM  Note     Pt was informed. Med list updated. no

## 2021-05-27 ENCOUNTER — TELEPHONE (OUTPATIENT)
Dept: PULMONOLOGY | Age: 66
End: 2021-05-27

## 2021-06-02 ENCOUNTER — TELEPHONE (OUTPATIENT)
Dept: PULMONOLOGY | Age: 66
End: 2021-06-02

## 2021-06-02 ENCOUNTER — VIRTUAL VISIT (OUTPATIENT)
Dept: PULMONOLOGY | Age: 66
End: 2021-06-02
Payer: MEDICARE

## 2021-06-02 DIAGNOSIS — J44.9 CHRONIC OBSTRUCTIVE PULMONARY DISEASE, UNSPECIFIED COPD TYPE (HCC): ICD-10-CM

## 2021-06-02 DIAGNOSIS — J96.11 CHRONIC HYPOXEMIC RESPIRATORY FAILURE (HCC): ICD-10-CM

## 2021-06-02 DIAGNOSIS — G47.33 OSA (OBSTRUCTIVE SLEEP APNEA): Primary | ICD-10-CM

## 2021-06-02 PROCEDURE — 99442 PR PHYS/QHP TELEPHONE EVALUATION 11-20 MIN: CPT | Performed by: INTERNAL MEDICINE

## 2021-06-02 ASSESSMENT — SLEEP AND FATIGUE QUESTIONNAIRES
HOW LIKELY ARE YOU TO NOD OFF OR FALL ASLEEP WHEN YOU ARE A PASSENGER IN A CAR FOR AN HOUR WITHOUT A BREAK: 0
HOW LIKELY ARE YOU TO NOD OFF OR FALL ASLEEP WHILE SITTING INACTIVE IN A PUBLIC PLACE: 0
HOW LIKELY ARE YOU TO NOD OFF OR FALL ASLEEP WHILE LYING DOWN TO REST IN THE AFTERNOON WHEN CIRCUMSTANCES PERMIT: 2
HOW LIKELY ARE YOU TO NOD OFF OR FALL ASLEEP WHILE SITTING QUIETLY AFTER LUNCH WITHOUT ALCOHOL: 0
HOW LIKELY ARE YOU TO NOD OFF OR FALL ASLEEP WHILE SITTING AND TALKING TO SOMEONE: 0
ESS TOTAL SCORE: 2
HOW LIKELY ARE YOU TO NOD OFF OR FALL ASLEEP IN A CAR, WHILE STOPPED FOR A FEW MINUTES IN TRAFFIC: 0
HOW LIKELY ARE YOU TO NOD OFF OR FALL ASLEEP WHILE WATCHING TV: 0
HOW LIKELY ARE YOU TO NOD OFF OR FALL ASLEEP WHILE SITTING AND READING: 0

## 2021-06-02 NOTE — TELEPHONE ENCOUNTER
Within this Telehealth Consent, the terms you and yours refer to the person using the Telehealth Service (Service), or in the case of a use of the Service by or on behalf of a minor, you and yours refer to and include (i) the parent or legal guardian who provides consent to the use of the Service by such minor or uses the Service on behalf of such minor, and (ii) the minor for whom consent is being provided or on whose behalf the Service is being utilized. When using Service, you will be consulting with your health care providers via the use of Telehealth.   Telehealth involves the delivery of healthcare services using electronic communications, information technology or other means between a healthcare provider and a patient who are not in the same physical location. Telehealth may be used for diagnosis, treatment, follow-up and/or patient education, and may include, but is not limited to, one or more of the following:    Electronic transmission of medical records, photo images, personal health information or other data between a patient and a healthcare provider    Interactions between a patient and healthcare provider via audio, video and/or data communications    Use of output data from medical devices, sound and video files    Anticipated Benefits   The use of Telehealth by your Provider(s) through the Service may have the following possible benefits:    Making it easier and more efficient for you to access medical care and treatment for the conditions treated by such Provider(s) utilizing the Service    Allowing you to obtain medical care and treatment by Provider(s) at times that are convenient for you    Enabling you to interact with Provider(s) without the necessity of an in-office appointment     Possible Risks   While the use of Telehealth can provide potential benefits for you, there are also potential risks associated with the use of Telehealth.  These risks include, but may not be limited to the following:    Your Provider(s) may not able to provide medical treatment for your particular condition and you may be required to seek alternative healthcare or emergency care services.  The electronic systems or other security protocols or safeguards used in the Service could fail, causing a breach of privacy of your medical or other information.  Given regulatory requirements in certain jurisdictions, your Provider(s) diagnosis and/or treatment options, especially pertaining to certain prescriptions, may be limited. Acceptance   1. You understand that Services will be provided via Telehealth. This process involves the use of HIPAA compliant and secure, real-time audio-visual interfacing with a qualified and appropriately trained provider located at [facility]. 2. You understand that, under no circumstances, will this session be recorded. 3. You understand that the Provider(s) at [facility] and other clinical participants will be party to the information obtained during the Telehealth session in accordance with best medical practices. 4. You understand that the information obtained during the Telehealth session will be used to help determine the most appropriate treatment options. 5. You understand that You have the right to revoke this consent at any point in time. 6. You understand that Telehealth is voluntary, and that continued treatment is not dependent upon consent. 7. You understand that, in the event of non-consent to Telehealth services and/or technical difficulties, you will obtain services as typically provided in the absence of Telehealth technology. 8. You understand that this consent will be kept in Your medical record. 9. No potential benefits from the use of Telehealth or specific results can be guaranteed. Your condition may not be cured or improved and, in some cases, may get worse.    10. There are limitations in the provision of medical care and treatment via Telehealth and the Service and you may not be able to receive diagnosis and/or treatment through the Service for every condition for which you seek diagnosis and/or treatment. 11. There are potential risks to the use of Telehealth, including but not limited to the risks described in this Telehealth Consent. 12. Your Provider(s) have discussed the use of Telehealth and the Service with you, including the benefits and risks of such and you have provided oral consent to your Provider(s) for the use of Telehealth and the Service. 15. You understand that it is your duty to provide your Provider(s) truthful, accurate and complete information, including all relevant information regarding care that you may have received or may be receiving from other healthcare providers outside of the Service. 14. You understand that each of your Provider(s) may determine in his or sole discretion that your condition is not suitable for diagnosis and/or treatment using the Service, and that you may need to seek medical care and treatment a specialist or other healthcare provider, outside of the Service. 15. You understand that you are fully responsible for payment for all services provided by Provider(s) or through use of the Service and that you may not be able to use third-party insurance. 16. You represent that (a) you have read this Telehealth Consent carefully, (b) you understand the risks and benefits of the Service and the use of Telehealth in the medical care and treatment provided to you by Provider(s) using the Service, and (c) you have the legal capacity and authority to provide this consent for yourself and/or the minor for which you are consenting under applicable federal and state laws, including laws relating to the age of [de-identified] and/or parental/guardian consent.    17. You give your informed consent to the use of Telehealth by Provider(s) using the Service under the terms described in the Terms of Service and this Telehealth Consent. The patient was read the following statement and has consented to the visit as of 6/2/21. The patient has been scheduled for their first telehealth visit on 6/2/21 with .

## 2021-06-02 NOTE — PROGRESS NOTES
[facility]                                                            Outpatient Follow Up Note  TELEHEALTH EVALUATION: Service performed was Audio (During CNWVQ-53 public health emergency) and not a face-to-face visit     CHIEF COMPLAINT: Follow-up AIDE/COPD        HPI  Not doing too bad  Cough with thick white sputum  No hemoptysis   Uses Neb BID  No smoking   Uses O2 on exertion   Sat 91% RA   No smoking       Patient is using CPAP every night, 8-9 hrs/night. Using humidifier. The pressure is well tolerated. The mask is comfortable- full face mask. No significant daytime sleepiness. No nodding off when driving. Bedtime is 11:30 pm and rise time is 8 am. Sleep onset is 20-30 minutes. ESS is 2. Past Medical History:   Diagnosis Date    Bronchiectasis ([address]     Bronchitis chronic     [facility]phy[facility] lung [facility] [address]     Hypertension     Insomnia     AIDE (obstructive sleep apnea)     Pneumonia        Past Surgical History:        Procedure Laterality Date    COLONOSCOPY  03/30/2017    Normal    MOUTH SURGERY Bilateral        Allergies:  has No Known Allergies. Social History:    TOBACCO:   reports that he quit smoking about 17 years ago. His smoking use included cigarettes. He has a 52.50 pack-year smoking history. He has never used smokeless tobacco.  ETOH:   reports no history of alcohol use.       Family History:       Problem Relation Age of Onset    Emphysema Father     Heart Failure Father     Diabetes Father     Hypertension Father     Asthma Neg Hx     Cancer Neg Hx        Current Medications:    Current Outpatient Medications:     albuterol sulfate  (90 Base) MCG/ACT inhaler, INHALE 2 PUFFS INTO THE LUNGS EVERY 6 HOURS AS NEEDED FOR WHEEZING OR SHORTNESS OF BREATH, Disp: 54 Inhaler, Rfl: 1    Fluticasone furoate-vilanterol (BREO ELLIPTA) 200-25 MCG/INH AEPB inhaler, Inhale 1 puff into the lungs daily, Disp: 30 each, Rfl: 6    ipratropium-albuterol (DUONEB) 0.5-2.5 (3) MG/3ML SOLN nebulizer solution, INHALE 3 MLS INTO THE LUNGS EVERY 6 HOURS AS NEEDED FOR SHORTNESS OF BREATH (DYSPNEA OR WHEEZES), Disp: 360 mL, Rfl: 5    SYNTHROID 137 MCG tablet, Take 137 mcg by mouth Daily , Disp: , Rfl:     Nebulizers (COMPRESSOR/NEBULIZER) MISC, by Does not apply route. DX COPD Aylin, Disp: 1 each, Rfl: 0    Misc. Devices (ACAPELLA) MISC, by Does not apply route 4 times daily. , Disp: 1 each, Rfl: 0    pravastatin (PRAVACHOL) 40 MG tablet, Take 40 mg by mouth daily. , Disp: , Rfl:     potassium chloride (KLOR-CON) 10 MEQ CR tablet, Take 10 mEq by mouth daily. , Disp: , Rfl:     triamterene-hydrochlorothiazide (MAXZIDE-25) 37.5-25 MG per tablet, Take 1 tablet by mouth daily. , Disp: , Rfl:     Respiratory Therapy Supplies (NEBULIZER COMPRESSOR) KIT, 1 kit by Does not apply route once for 1 dose Please supply patient with nebulizer compressor E 0570 + Nebulizer kit  1 x per 6 mo  Patient uses Duoneb in nebulizer  Q 6 hours  NPI: Dr Bia Velázquez, [id number] [facility] (Patient not taking: Reported on 12/7/2020), Disp: 1 kit, Rfl: 0        Objective:   Telephone visit not able to obtain physical exam               DATA reviewed by me:   PFTs 08/29/2012 FEV1 2.06L(58%) TLC 8.07L(115%) DLCO 13.56 (44%) 6MW 840 F LO2 84%     6-minute walk 10/1/2019 1 L at rest and 6 L on exertion    CT chest 5/26/2020   0.9 x 0.5 cm partially solid nodular opacity in the left upper lobe which  appears new. Solid component measures 5 mm. A few nodules in the left lower  lobe measuring up to 4 mm, unchanged. Potentially Significant Incidentals (LungRADS Category S): Emphysema. Pulmonary incidentals:  Mild bronchial wall thickening in the mid and lower  lungs.   Other incidentals:  None      CT chest 11/27/2020  Regression of KARIME nodule  No findings worrisome for malignancy  Severe emphysema  No acute abnormalities    PSG AHI 8.3    Overnight pulse oximeter 9/26/2019 on CPAP 8 cm H2O with significant desaturation 6 hours and 56 minutes below 88%          CPAP data 02/13-03/14 2018 reviewed by me. Uses 8-9  hrs/night with 100% compliance and AHI of 1.2. CPAP 8 cmH2O   CPAP data 08/28-09/26 2018 reviewed by me. Uses 8-9  hrs/night with 100% compliance and AHI of 1.3. CPAP 8 cmH2O   CPAP data 08/27-09/25 2019 reviewed by me. Uses 4-5  hrs/night with 60% compliance and AHI of 0.9. CPAP 8 cmH2O   CPAP data 11/11-12/10 2021 reviewed by me. Uses 8-9  hrs/night with 100% compliance and AHI of 1.3 . CPAP 8 cmH2O   Assessment:       · Mild AIDE. CPAP 8 cmH2O with 2LPM O2. Full face mask. Refusing O2 with CPAP. Optimal compliance and efficacy upon review today. · Moderate COPD  · Chronic hypoxemic respiratory failure 1 L at rest, 6 L exertion, 2 L at night with CPAP  · Bronchiectasis  · New KARIME 9 mm nodule on CT 5/26/2020. Regressed on repeat CT 11/27/2020. · 45 pack year smoking- Quit 2004. Plan:       · Continue CPAP 6-8 hrs at night and during naps. · Advised to use O2 with his CPAP  · Replacement of mask, tubing, head straps every 3-6 months or sooner if damaged. · Follow up CPAP compliance and pressure adjustment if needed  · Sleep hygiene  · Avoid sedatives, alcohol and caffeinated drinks at bed time. · No driving motorized vehicles or operating heavy machinery while fatigue, drowsy or sleepy. · Continue Advair 250/50 BID and DuoNeb QID and Albuterol 2 puffs Q4-6 hrs PRN  · Advised to use O2 1 L at rest and 6LPM on exertion- Advised to titrate O2 using her pulse oximeter- target O2 sat 90-92%. · Patient is up to date with Pneumococcal vaccine and influenza vaccine  · Advised to get his Covid vaccine- declining for now. Risks and benefits were discussed with patients    · Follow up in 6 month           Tim Howell is a 78 y.o. male evaluated via telephone on 6/2/2021.       Consent:  He and/or health care decision maker is aware that that he may receive a bill for this telephone service, depending on his insurance coverage, and has provided verbal consent to proceed: Yes       Documentation:  I communicated with the patient and/or health care decision maker about: See above   Details of this discussion including any medical advice provided: See above       I Affirm this is a Patient Initiated Episode with an Established Patient who has not had a related appointment within my department in the past 7 days or scheduled within the next 24 hours.     Total Time: 11-20 minutes     Note: not billable if this call serves to triage the patient into an appointment for the relevant concern      Brodie Ruby MD

## 2021-06-10 ENCOUNTER — TELEPHONE (OUTPATIENT)
Dept: PULMONOLOGY | Age: 66
End: 2021-06-10

## 2021-06-10 NOTE — TELEPHONE ENCOUNTER
Patient called states machine states it has meet max hours. Requesting rx for new machine. Aylin GT    6/2/21    Assessment:       · Mild AIDE. CPAP 8 cmH2O with 2LPM O2. Full face mask. Refusing O2 with CPAP. Optimal compliance and efficacy upon review today. · Moderate COPD  · Chronic hypoxemic respiratory failure- 1 L at rest, 6 L exertion, 2 L at night with CPAP  · Bronchiectasis  · New KARIME 9 mm nodule on CT 5/26/2020. Regressed on repeat CT 11/27/2020. · 45 pack year smoking- Quit 2004. Plan:       · Continue CPAP 6-8 hrs at night and during naps. · Advised to use O2 with his CPAP  · Replacement of mask, tubing, head straps every 3-6 months or sooner if damaged. · Follow up CPAP compliance and pressure adjustment if needed  · Sleep hygiene  · Avoid sedatives, alcohol and caffeinated drinks at bed time. · No driving motorized vehicles or operating heavy machinery while fatigue, drowsy or sleepy. · Continue Advair 250/50 BID and DuoNeb QID and Albuterol 2 puffs Q4-6 hrs PRN  · Advised to use O2 1 L at rest and 6LPM on exertion- Advised to titrate O2 using her pulse oximeter- target O2 sat 90-92%. · Patient is up to date with Pneumococcal vaccine and influenza vaccine  · Advised to get his Covid vaccine- declining for now.  Risks and benefits were discussed with patients    · Follow up in 6 month

## 2021-06-11 NOTE — TELEPHONE ENCOUNTER
Spoke with Shannan from [employer] whom states the order from Feb will work. Order faxed along with note.

## 2021-09-15 ENCOUNTER — OFFICE VISIT (OUTPATIENT)
Dept: PULMONOLOGY | Age: 66
End: 2021-09-15
Payer: MEDICARE

## 2021-09-15 VITALS
HEIGHT: 69 IN | WEIGHT: 220 LBS | RESPIRATION RATE: 22 BRPM | TEMPERATURE: 97.1 F | DIASTOLIC BLOOD PRESSURE: 72 MMHG | SYSTOLIC BLOOD PRESSURE: 116 MMHG | OXYGEN SATURATION: 91 % | HEART RATE: 74 BPM | BODY MASS INDEX: 32.58 KG/M2

## 2021-09-15 DIAGNOSIS — Z87.891 HISTORY OF TOBACCO USE: ICD-10-CM

## 2021-09-15 DIAGNOSIS — J44.9 COPD, MODERATE (HCC): ICD-10-CM

## 2021-09-15 DIAGNOSIS — J96.11 CHRONIC HYPOXEMIC RESPIRATORY FAILURE (HCC): ICD-10-CM

## 2021-09-15 DIAGNOSIS — G47.33 MILD OBSTRUCTIVE SLEEP APNEA: Primary | ICD-10-CM

## 2021-09-15 DIAGNOSIS — R91.1 PULMONARY NODULE: ICD-10-CM

## 2021-09-15 DIAGNOSIS — J47.9 BRONCHIECTASIS WITHOUT COMPLICATION (HCC): ICD-10-CM

## 2021-09-15 PROCEDURE — 99214 OFFICE O/P EST MOD 30 MIN: CPT | Performed by: INTERNAL MEDICINE

## 2021-09-15 RX ORDER — GUAIFENESIN 600 MG/1
600 TABLET, EXTENDED RELEASE ORAL 2 TIMES DAILY
Qty: 60 TABLET | Refills: 6 | Status: SHIPPED | OUTPATIENT
Start: 2021-09-15

## 2021-09-15 ASSESSMENT — SLEEP AND FATIGUE QUESTIONNAIRES
HOW LIKELY ARE YOU TO NOD OFF OR FALL ASLEEP WHILE SITTING INACTIVE IN A PUBLIC PLACE: 0
ESS TOTAL SCORE: 5
HOW LIKELY ARE YOU TO NOD OFF OR FALL ASLEEP IN A CAR, WHILE STOPPED FOR A FEW MINUTES IN TRAFFIC: 0
HOW LIKELY ARE YOU TO NOD OFF OR FALL ASLEEP WHILE LYING DOWN TO REST IN THE AFTERNOON WHEN CIRCUMSTANCES PERMIT: 2
HOW LIKELY ARE YOU TO NOD OFF OR FALL ASLEEP WHILE WATCHING TV: 3
HOW LIKELY ARE YOU TO NOD OFF OR FALL ASLEEP WHILE SITTING QUIETLY AFTER LUNCH WITHOUT ALCOHOL: 0
HOW LIKELY ARE YOU TO NOD OFF OR FALL ASLEEP WHILE SITTING AND READING: 0
HOW LIKELY ARE YOU TO NOD OFF OR FALL ASLEEP WHEN YOU ARE A PASSENGER IN A CAR FOR AN HOUR WITHOUT A BREAK: 0
NECK CIRCUMFERENCE (INCHES): 16
HOW LIKELY ARE YOU TO NOD OFF OR FALL ASLEEP WHILE SITTING AND TALKING TO SOMEONE: 0

## 2021-09-15 NOTE — PATIENT INSTRUCTIONS
Remember to bring all pulmonary medications to your next appointment with the office. Please keep all of your future appointments scheduled by [address], [facility] office. Out of respect for other patients and providers, you may be asked to reschedule your appointment if you arrive later than your scheduled appointment time. Appointments cancelled less than 24hrs in advance will be considered a no show. Patients with three missed appointments within 1 year or four missed appointments within 2 years can be dismissed from the practice. Sleep Hygiene. .. Tips for better sleep. .. Avoid naps. This will ensure you are sleepy at bedtime. If you have to take a nap, sleep less than 1 hour, before 3 pm.  Sleep only when sleepy; this reduces the time you are awake in bed. Regular exercise is recommended to help you deepen your sleep, but not within 4-6 hours of your bedtime. Timing of exercise is important, aim to exercise early in the morning or early afternoon. A light snack may help you fall asleep. Warm milk and foods high in the amino acid tryptophan, such as bananas, may help you to sleep  Be sure to avoid heavy, spicy or sugary foods 4-6 hours before bedtime and avoid at snack time. Stay away from stimulants such as caffeine and nicotine for at least 4-6 hours before bed. Stimulants can interfere with your ability to fall asleep. Caffeine is found in tea, cola, coffee, cocoa and chocolate and is best avoided at bedtime. Nicotine is found in tobacco products. Avoid alcohol 4-6 hours before bedtime. Alcohol has an immediate sleep-inducing effect, after a few hours when alcohol levels fall there is a stimulant or wake-up effect and will cause fragmented sleep. Sleep rituals are important. Give your body clues it is time to slow down and sleep. Examples include; yoga, deep breathing, listen to relaxing music, a hot bath or a few minutes of reading.   Have a fixed bedtime and will cause the tubing to become yellow, brittle and crack over a period of time. DO NOT attach the wet tubing to your CPAP unit to blow-dry it. The moisture from the tubing can drain back into your machine. Moisture in your unit can cause sudden pressure increases or short circuits  DO's and DON'Ts:  - Don't use alcohol-based products to clean your mask, because it can cause the materials to become hard and brittle. - Don't put headgear in the washer or dryer  - Don't use any caustic or household cleaning solutions such as bleach on your CPAP   equipment.  - Do follow the recommended cleaning schedule. - Do change your disposable filter frequently. Adapted From: [employer]"/cleaning. shtm.   These are general suggestions for all models please follow specific s recommendations and specific instructions

## 2021-09-15 NOTE — PROGRESS NOTES
[facility] Pulmonary, [facility] Care [facility] Sleep Specialists                                                            Outpatient Follow Up Note      CHIEF COMPLAINT: Follow-up AIDE/COPD        HPI  Doing good. Started new CPAP and working well. Likes it better. Patient is using CPAP every night, 7-8 hrs/night. Using humidifier. The pressure is well tolerated. The mask is comfortable- full face mask. No significant daytime sleepiness. No nodding off when driving. Bedtime is 11:30 pm and rise time is 7-7:30 am. Sleep onset is 15-20 minutes. ESS is 5. Breathing is not too bad  Congested at times   Mostly white sputum   Uses Neb BID  No smoking                 Past Medical History:   Diagnosis Date    Bronchiectasis ([address]     Bronchitis chronic     Emphysema of lung ([address]     Hypertension     Insomnia     AIDE (obstructive sleep apnea)     Pneumonia        Past Surgical History:        Procedure Laterality Date    COLONOSCOPY  03/30/2017    Normal    MOUTH SURGERY Bilateral        Allergies:  has No Known Allergies. Social History:    TOBACCO:   reports that he quit smoking about 17 years ago. His smoking use included cigarettes. He has a 52.50 pack-year smoking history. He has never used smokeless tobacco.  ETOH:   reports no history of alcohol use.       Family History:       Problem Relation Age of Onset    Emphysema Father     Heart Failure Father     Diabetes Father     Hypertension Father     Asthma Neg Hx     Cancer Neg Hx        Current Medications:    Current Outpatient Medications:     guaiFENesin (MUCINEX) 600 MG extended release tablet, Take 1 tablet by mouth 2 times daily, Disp: 60 tablet, Rfl: 6    albuterol sulfate  (90 Base) MCG/ACT inhaler, INHALE 2 PUFFS INTO THE LUNGS EVERY 6 HOURS AS NEEDED FOR WHEEZING OR SHORTNESS OF BREATH, Disp: 54 Inhaler, Rfl: 1    Fluticasone furoate-vilanterol (BREO ELLIPTA) 200-25 MCG/INH AEPB inhaler, Inhale 1 puff into the lungs daily, Disp: 30 each, Rfl: 6    ipratropium-albuterol (DUONEB) 0.5-2.5 (3) MG/3ML SOLN nebulizer solution, INHALE 3 MLS INTO THE LUNGS EVERY 6 HOURS AS NEEDED FOR SHORTNESS OF BREATH (DYSPNEA OR WHEEZES), Disp: 360 mL, Rfl: 5    Nebulizers (COMPRESSOR/NEBULIZER) MISC, by Does not apply route. DX COPD Aylin, Disp: 1 each, Rfl: 0    Misc. Devices (ACAPELLA) MISC, by Does not apply route 4 times daily. , Disp: 1 each, Rfl: 0    Respiratory Therapy Supplies (NEBULIZER COMPRESSOR) KIT, 1 kit by Does not apply route once for 1 dose Please supply patient with nebulizer compressor E 0570 + Nebulizer kit  1 x per 6 mo  Patient uses Duoneb in nebulizer  Q 6 hours  NPI: Dr Jorge Parra, [id number] [facility] (Patient not taking: Reported on 12/7/2020), Disp: 1 kit, Rfl: 0    SYNTHROID 137 MCG tablet, Take 137 mcg by mouth Daily , Disp: , Rfl:     pravastatin (PRAVACHOL) 40 MG tablet, Take 40 mg by mouth daily. , Disp: , Rfl:     potassium chloride (KLOR-CON) 10 MEQ CR tablet, Take 10 mEq by mouth daily. , Disp: , Rfl:     triamterene-hydrochlorothiazide (MAXZIDE-25) 37.5-25 MG per tablet, Take 1 tablet by mouth daily. , Disp: , Rfl:         Objective:   Blood pressure 116/72, pulse 74, temperature 97.1 °F (36.2 °C), resp. rate 22, height 5' 9\" (1.753 m), weight 220 lb (99.8 kg), SpO2 91 %.' on 4LPM   Gen: No distress. Obese. BMI of 32.49  Eyes: PERRL. No sclera icterus. No conjunctival injection. ENT: No discharge. Pharynx clear. Mallampati class IV. Neck: Trachea midline. No obvious mass. Neck circumference 16\"  Resp: No accessory muscle use. No crackles. Few wheezes. No rhonchi. No dullness on percussion. Good air entry. CV: Regular rate. Regular rhythm. No murmur or rub. No edema. GI: Non-tender. Non-distended. No hernia. Skin: Warm and dry. No nodule on exposed extremities. Lymph: No cervical LAD. No supraclavicular LAD. M/S: No cyanosis. No joint deformity. No clubbing.    Neuro: use O2 with his CPAP  Continue CPAP 6-8 hrs at night and during naps. Replacement of mask, tubing, head straps every 3-6 months or sooner if damaged. Follow up CPAP compliance and pressure adjustment if needed  Sleep hygiene  Avoid sedatives, alcohol and caffeinated drinks at bed time. No driving motorized vehicles or operating heavy machinery while fatigue, drowsy or sleepy. Weight loss is also recommended as a long-term intervention. Continue  Advair 250/50 BID and DuoNeb QID and Albuterol 2 puffs Q4-6 hrs PRN  Guaifenesin (Mucinex) Extended-release tablet 600 mg every 12 hours as needed  Advised to use Acapella QID to mobilize respiratory secretions  Advised to use O2 1 L at rest and 6LPM on exertion- Advised to titrate O2 using her pulse oximeter- target O2 sat 90-92%. Patient is up to date with first Covid shot, pneumococcal vaccine and influenza vaccine  CT chest, low dose protocol, screening for lung cancer November 2021. Risks, benefits and alternatives including doing nothing were discussed with patient.   Advised to get his second covid shot   Follow up in 6 month

## 2021-11-03 ENCOUNTER — HOSPITAL ENCOUNTER (OUTPATIENT)
Dept: CT IMAGING | Age: 66
Discharge: HOME | End: 2021-11-03
Payer: MEDICARE

## 2021-11-03 DIAGNOSIS — Z87.891 HISTORY OF TOBACCO USE: ICD-10-CM

## 2021-11-03 PROCEDURE — 71271 CT THORAX LUNG CANCER SCR C-: CPT

## 2022-01-01 DIAGNOSIS — J44.9 MODERATE COPD (CHRONIC OBSTRUCTIVE PULMONARY DISEASE) (HCC): ICD-10-CM

## 2022-01-03 RX ORDER — IPRATROPIUM BROMIDE AND ALBUTEROL SULFATE 2.5; .5 MG/3ML; MG/3ML
SOLUTION RESPIRATORY (INHALATION)
Qty: 360 ML | Refills: 5 | Status: SHIPPED | OUTPATIENT
Start: 2022-01-03 | End: 2022-07-11 | Stop reason: SDUPTHER

## 2022-01-06 ENCOUNTER — OFFICE VISIT (OUTPATIENT)
Dept: PULMONOLOGY | Age: 67
End: 2022-01-06
Payer: MEDICARE

## 2022-01-06 VITALS
BODY MASS INDEX: 32.44 KG/M2 | SYSTOLIC BLOOD PRESSURE: 128 MMHG | TEMPERATURE: 98.1 F | RESPIRATION RATE: 16 BRPM | HEIGHT: 69 IN | WEIGHT: 219 LBS | DIASTOLIC BLOOD PRESSURE: 70 MMHG | HEART RATE: 73 BPM | OXYGEN SATURATION: 93 %

## 2022-01-06 DIAGNOSIS — G47.33 MILD OBSTRUCTIVE SLEEP APNEA: Primary | ICD-10-CM

## 2022-01-06 DIAGNOSIS — J44.9 COPD, MODERATE (HCC): ICD-10-CM

## 2022-01-06 DIAGNOSIS — J96.11 CHRONIC HYPOXEMIC RESPIRATORY FAILURE (HCC): ICD-10-CM

## 2022-01-06 PROCEDURE — 99214 OFFICE O/P EST MOD 30 MIN: CPT | Performed by: INTERNAL MEDICINE

## 2022-01-06 RX ORDER — ALBUTEROL SULFATE 90 UG/1
2 AEROSOL, METERED RESPIRATORY (INHALATION) EVERY 6 HOURS PRN
Qty: 54 G | Refills: 2 | Status: SHIPPED | OUTPATIENT
Start: 2022-01-06 | End: 2022-07-11 | Stop reason: SDUPTHER

## 2022-01-06 ASSESSMENT — SLEEP AND FATIGUE QUESTIONNAIRES
HOW LIKELY ARE YOU TO NOD OFF OR FALL ASLEEP WHILE SITTING QUIETLY AFTER LUNCH WITHOUT ALCOHOL: 0
HOW LIKELY ARE YOU TO NOD OFF OR FALL ASLEEP WHILE LYING DOWN TO REST IN THE AFTERNOON WHEN CIRCUMSTANCES PERMIT: 3
HOW LIKELY ARE YOU TO NOD OFF OR FALL ASLEEP WHILE SITTING INACTIVE IN A PUBLIC PLACE: 0
HOW LIKELY ARE YOU TO NOD OFF OR FALL ASLEEP WHILE SITTING AND READING: 0
HOW LIKELY ARE YOU TO NOD OFF OR FALL ASLEEP WHILE SITTING AND TALKING TO SOMEONE: 0
HOW LIKELY ARE YOU TO NOD OFF OR FALL ASLEEP IN A CAR, WHILE STOPPED FOR A FEW MINUTES IN TRAFFIC: 0
NECK CIRCUMFERENCE (INCHES): 16.5
HOW LIKELY ARE YOU TO NOD OFF OR FALL ASLEEP WHILE WATCHING TV: 1
ESS TOTAL SCORE: 4
HOW LIKELY ARE YOU TO NOD OFF OR FALL ASLEEP WHEN YOU ARE A PASSENGER IN A CAR FOR AN HOUR WITHOUT A BREAK: 0

## 2022-01-06 NOTE — PATIENT INSTRUCTIONS
Remember to bring all pulmonary medications to your next appointment with the office. Please keep all of your future appointments scheduled by [address], [facility] office. Out of respect for other patients and providers, you may be asked to reschedule your appointment if you arrive later than your scheduled appointment time. Appointments cancelled less than 24hrs in advance will be considered a no show. Patients with three missed appointments within 1 year or four missed appointments within 2 years can be dismissed from the practice. Sleep Hygiene. .. Tips for better sleep. .. Avoid naps. This will ensure you are sleepy at bedtime. If you have to take a nap, sleep less than 1 hour, before 3 pm.  Sleep only when sleepy; this reduces the time you are awake in bed. Regular exercise is recommended to help you deepen your sleep, but not within 4-6 hours of your bedtime. Timing of exercise is important, aim to exercise early in the morning or early afternoon. A light snack may help you fall asleep. Warm milk and foods high in the amino acid tryptophan, such as bananas, may help you to sleep  Be sure to avoid heavy, spicy or sugary foods 4-6 hours before bedtime and avoid at snack time. Stay away from stimulants such as caffeine and nicotine for at least 4-6 hours before bed. Stimulants can interfere with your ability to fall asleep. Caffeine is found in tea, cola, coffee, cocoa and chocolate and is best avoided at bedtime. Nicotine is found in tobacco products. Avoid alcohol 4-6 hours before bedtime. Alcohol has an immediate sleep-inducing effect, after a few hours when alcohol levels fall there is a stimulant or wake-up effect and will cause fragmented sleep. Sleep rituals are important. Give your body clues it is time to slow down and sleep. Examples include; yoga, deep breathing, listen to relaxing music, a hot bath or a few minutes of reading.   Have a fixed bedtime and awakening time, Even on weekends! You will feel better keeping a regular sleep cycle, even if you are retired or not working. Get into your favorite sleep position. If not asleep in 30 minutes, get up and do something boring until you feel sleepy. Remember not to expose yourself to bright lights such as TV, phone or tablet screens. Only use your bed for sleeping. Do not use your bed as an office, workroom or recreation room. Use comfortable bedding. Uncomfortable bedding can prevent good sleep. Ensure your bedroom is quiet and comfortable. A cooler room along with enough blankets to stay warm is recommended. If your room is too noisy, try a white noise machine. If too bright, try black out shades or an eye mask. Dont take worries to bed. Leave worries about work, school etc. behind you when you go to bed. Some people find it helpful to assign a worry period in the evening or late afternoon to write down your worries and get them out of your system. CPAP Equipment Cleaning and Disinfecting Schedule  Equipment Cleaning Frequency Instructions  Disinfecting Frequency   Non-Disposable Filters  Weekly Mild soapy water, Rinse, Air Dry Not Required   Disposable Filters Change as needed  2-4 weeks Do Not Wash Not Required   Hose/tubing Daily Mild soapy water, Rinse, Air Dry Once a week   Mask / Nasal Pillows Daily Mild soapy water, Rinse, Air Dry Once a week   Headgear Weekly Hand wash, Mild soapy water, Rinse, Dry  Not Required   Humidifier Daily Empty water daily  Mild soapy water, Rinse well, Air Dry  Once a week   CPAP Unit As Needed Dust with damp cloth,  No detergents or sprays Not Required         Disinfect (per schedule) with 1 part white vinegar and 3 parts water- soak mask and water chamber for 30 minutes every 1-2 weeks, more often if sick. Allow water/vinegar mixture to run through tubing. Allow all equipment to air dry.    Drying Hints:   Always hang tubing away from direct sunlight, as this will cause the tubing to become yellow, brittle and crack over a period of time. DO NOT attach the wet tubing to your CPAP unit to blow-dry it. The moisture from the tubing can drain back into your machine. Moisture in your unit can cause sudden pressure increases or short circuits  DO's and DON'Ts:  - Don't use alcohol-based products to clean your mask, because it can cause the materials to become hard and brittle. - Don't put headgear in the washer or dryer  - Don't use any caustic or household cleaning solutions such as bleach on your CPAP   equipment.  - Do follow the recommended cleaning schedule. - Do change your disposable filter frequently. Adapted From: [employer]/cleaning. shtm.   These are general suggestions for all models please follow specific s recommendations and specific instructions

## 2022-01-06 NOTE — PROGRESS NOTES
[facility]                                                            Outpatient Follow Up Note      CHIEF COMPLAINT: Follow-up AIDE/COPD/follow-up CT chest        HPI  CT chest reviewed by me and noted below. Results were dicussed with patient and multiple good questions were answered. Doing good. Patient is using CPAP every night, 8-9 hrs/night. Using humidifier. The pressure is well tolerated. The mask is comfortable- full face mask. No significant daytime sleepiness. No nodding off when driving. Bedtime is 11-11:30 pm and rise time is 8:30-9 am. Sleep onset is 15 minutes. ESS is 4. Breathing is okay  White sputum  No hemoptysis   Uses Neb BID  No smoking                 Past Medical History:   Diagnosis Date    Bronchiectasis ([address]     Bronchitis chronic     Emphy[address] of lung ([facility] [address]     Hypertension     Insomnia     AIDE (obstructive sleep apnea)     Pneumonia        Past Surgical History:        Procedure Laterality Date    COLONOSCOPY  03/30/2017    Normal    MOUTH SURGERY Bilateral        Allergies:  has No Known Allergies. Social History:    TOBACCO:   reports that he quit smoking about 17 years ago. His smoking use included cigarettes. He has a 52.50 pack-year smoking history. He has never used smokeless tobacco.  ETOH:   reports no history of alcohol use.       Family History:       Problem Relation Age of Onset    Emphysema Father     Heart Failure Father     Diabetes Father     Hypertension Father     Asthma Neg Hx     Cancer Neg Hx        Current Medications:    Current Outpatient Medications:     ipratropium-albuterol (DUONEB) 0.5-2.5 (3) MG/3ML SOLN nebulizer solution, INHALE 3 MLS INTO THE LUNGS EVERY 6 HOURS AS NEEDED FOR SHORTNESS OF BREATH (DYSPNEA OR WHEEZES), Disp: 360 mL, Rfl: 5    albuterol sulfate  (90 Base) MCG/ACT inhaler, INHALE 2 PUFFS INTO THE LUNGS EVERY 6 HOURS AS NEEDED FOR WHEEZING OR SHORTNESS OF BREATH, Disp: 54 Inhaler, Rfl: 1    SYNTHROID 137 MCG tablet, Take 137 mcg by mouth Daily , Disp: , Rfl:     Nebulizers (COMPRESSOR/NEBULIZER) MISC, by Does not apply route. DX COPD Aylin, Disp: 1 each, Rfl: 0    Misc. Devices (ACAPELLA) MISC, by Does not apply route 4 times daily. , Disp: 1 each, Rfl: 0    pravastatin (PRAVACHOL) 40 MG tablet, Take 40 mg by mouth daily. , Disp: , Rfl:     potassium chloride (KLOR-CON) 10 MEQ CR tablet, Take 10 mEq by mouth daily. , Disp: , Rfl:     triamterene-hydrochlorothiazide (MAXZIDE-25) 37.5-25 MG per tablet, Take 1 tablet by mouth daily. , Disp: , Rfl:     guaiFENesin (MUCINEX) 600 MG extended release tablet, Take 1 tablet by mouth 2 times daily (Patient not taking: Reported on 1/6/2022), Disp: 60 tablet, Rfl: 6    Fluticasone furoate-vilanterol (BREO ELLIPTA) 200-25 MCG/INH AEPB inhaler, Inhale 1 puff into the lungs daily (Patient not taking: Reported on 1/6/2022), Disp: 30 each, Rfl: 6    Respiratory Therapy Supplies (NEBULIZER COMPRESSOR) KIT, 1 kit by Does not apply route once for 1 dose Please supply patient with nebulizer compressor E 0570 + Nebulizer kit  1 x per 6 mo  Patient uses Duoneb in nebulizer  Q 6 hours  NPI: Dr Piedad Shepherd, [id number] [place] (Patient not taking: Reported on 12/7/2020), Disp: 1 kit, Rfl: 0        Objective:   Blood pressure 128/70, pulse 73, temperature 98.1 °F (36.7 °C), temperature source Infrared, resp. rate 16, height 5' 9\" (1.753 m), weight 219 lb (99.3 kg), SpO2 93 %.' on 4LPM   Gen: No distress. Obese. BMI of 32.34  Eyes: PERRL. No sclera icterus. No conjunctival injection. ENT: No discharge. Pharynx clear. Mallampati class IV. Neck: Trachea midline. No obvious mass. Neck circumference 16.5\"  Resp: No accessory muscle use. No crackles. Few wheezes. No rhonchi. No dullness on percussion. Good air entry. CV: Regular rate. Regular rhythm. No murmur or rub. No edema. GI: Non-tender. Non-distended. No hernia. Skin: Warm and dry. No nodule on exposed extremities. Lymph: No cervical LAD. No supraclavicular LAD. M/S: No cyanosis. No joint deformity. No clubbing. Neuro: Awake. Alert. Moves all four extremities. Psych: Oriented x 3. No anxiety. DATA reviewed by me:   PFTs 08/29/2012 FEV1 2.06L(58%) TLC 8.07L(115%) DLCO 13.56 (44%) 6MW 840 F LO2 84%     6-minute walk 10/1/2019 1 L at rest and 6 L on exertion    CT chest 5/26/2020   0.9 x 0.5 cm partially solid nodular opacity in the left upper lobe which  appears new. Solid component measures 5 mm. A few nodules in the left lower  lobe measuring up to 4 mm, unchanged. Potentially Significant Incidentals (LungRADS Category S): Emphysema. Pulmonary incidentals:  Mild bronchial wall thickening in the mid and lower  lungs. Other incidentals:  None      CT chest 11/27/2020  Regression of KARIME nodule  No findings worrisome for malignancy  Severe emphysema  No acute abnormalities    CT chest 11/3/2021 imaging reviewed by me and showed  Stable solid subcentimeter nodules bilaterally      PSG AHI 8.3    Overnight pulse oximeter 9/26/2019 on CPAP 8 cm H2O with significant desaturation 6 hours and 56 minutes below 88%          CPAP data 02/13-03/14 2018 reviewed by me. Uses 8-9  hrs/night with 100% compliance and AHI of 1.2. CPAP 8 cmH2O   CPAP data 08/28-09/26 2018 reviewed by me. Uses 8-9  hrs/night with 100% compliance and AHI of 1.3. CPAP 8 cmH2O   CPAP data 08/27-09/25 2019 reviewed by me. Uses 4-5  hrs/night with 60% compliance and AHI of 0.9. CPAP 8 cmH2O   CPAP data 11/11-12/10 2020 reviewed by me. Uses 8-9  hrs/night with 100% compliance and AHI of 1.3 . CPAP 8 cmH2O   CPAP data 08/16-09/14 2021 reviewed by me. Uses 7-8  hrs/night with 100% compliance and AHI of 1.6. CPAP 8 cmH2O   CPAP data 12/06-01/04 2022 reviewed by me. Uses 8-9  hrs/night with 100% compliance and AHI of 1.6. CPAP 8 cmH2O     Assessment:       · Mild AIDE.  CPAP 8 cmH2O with 2LPM O2. Full face mask. Refusing O2 with CPAP. Optimal compliance and efficacy upon review today. · Moderate COPD  · Chronic hypoxemic respiratory failure- 1 L at rest, 6 L exertion, 2 L at night with CPAP  · Bronchiectasis  · New KARIME 9 mm nodule on CT 5/26/2020. Regressed on repeat CT 11/27/2020. Stable nodules on repeat CT 11/3/2021  · 45 pack year smoking- Quit 2004. Plan:       Order for CPAP supplies-advised to use O2 with CPAP  Continue CPAP 6-8 hrs at night and during naps. Replacement of mask, tubing, head straps every 3-6 months or sooner if damaged. Follow up CPAP compliance and pressure adjustment if needed  Sleep hygiene  Avoid sedatives, alcohol and caffeinated drinks at bed time. No driving motorized vehicles or operating heavy machinery while fatigue, drowsy or sleepy. Weight loss is also recommended as a long-term intervention. Continue Advair 250/50 BID and DuoNeb QID and Albuterol 2 puffs Q4-6 hrs PRN  Medications refills today   Acapella and Mucinex  Continue O2 1 L at rest and 6LPM on exertion- Advised to titrate O2 using her pulse oximeter- target O2 sat 90-92%.    Patient is up to date with first Covid shot, pneumococcal vaccine and influenza vaccine  Advised to get his second covid shot- verbalized understanding   Follow up in 6 month

## 2022-04-11 ENCOUNTER — TELEPHONE (OUTPATIENT)
Dept: PULMONOLOGY | Age: 67
End: 2022-04-11

## 2022-04-11 NOTE — LETTER
[facility] Pulmonary, Critical Care, and Sleep  [address], [address]  [address] [address]  Phone: [phone]  Fax: [phone]    Yannick Luong MD    April 12, 2022     Patient: Riri Adler   YOB: 1955   Date of Visit: 4/11/2022     To Whom It May Concern: It is my medical opinion that Prudencio Vargas requires a disability parking placard for the following reasons:  He uses portable oxygen. Duration of need: 5 years    If you have any questions or concerns, please don't hesitate to call.     Sincerely,        Yannick Luong MD

## 2022-04-11 NOTE — TELEPHONE ENCOUNTER
Pt called stating that his script for handicap placard will run out May 2022 and he needs a new script. Pt asking for letter to be mailed to him. Please advise. OV 1/6/22:    Assessment:       · Mild AIDE. CPAP 8 cmH2O with 2LPM O2. Full face mask. Refusing O2 with CPAP. Optimal compliance and efficacy upon review today. · Moderate COPD  · Chronic hypoxemic respiratory failure- 1 L at rest, 6 L exertion, 2 L at night with CPAP  · Bronchiectasis  · New KARIME 9 mm nodule on CT 5/26/2020. Regressed on repeat CT 11/27/2020. Stable nodules on repeat CT 11/3/2021  · 45 pack year smoking- Quit 2004. Plan:       · Order for CPAP supplies-advised to use O2 with CPAP  · Continue CPAP 6-8 hrs at night and during naps. · Replacement of mask, tubing, head straps every 3-6 months or sooner if damaged. · Follow up CPAP compliance and pressure adjustment if needed  · Sleep hygiene  · Avoid sedatives, alcohol and caffeinated drinks at bed time. · No driving motorized vehicles or operating heavy machinery while fatigue, drowsy or sleepy. · Weight loss is also recommended as a long-term intervention. · Continue Advair 250/50 BID and DuoNeb QID and Albuterol 2 puffs Q4-6 hrs PRN  · Medications refills today   · Acapella and Mucinex  · Continue O2 1 L at rest and 6LPM on exertion- Advised to titrate O2 using her pulse oximeter- target O2 sat 90-92%.    · Patient is up to date with first Covid shot, pneumococcal vaccine and influenza vaccine  · Advised to get his second covid shot- verbalized understanding   · Follow up in 6 month

## 2022-07-11 ENCOUNTER — OFFICE VISIT (OUTPATIENT)
Dept: PULMONOLOGY | Age: 67
End: 2022-07-11
Payer: MEDICARE

## 2022-07-11 VITALS
OXYGEN SATURATION: 90 % | HEIGHT: 69 IN | WEIGHT: 227.4 LBS | SYSTOLIC BLOOD PRESSURE: 122 MMHG | DIASTOLIC BLOOD PRESSURE: 78 MMHG | HEART RATE: 79 BPM | BODY MASS INDEX: 33.68 KG/M2

## 2022-07-11 DIAGNOSIS — G47.33 MILD OBSTRUCTIVE SLEEP APNEA: Primary | ICD-10-CM

## 2022-07-11 DIAGNOSIS — J44.9 COPD, MODERATE (HCC): ICD-10-CM

## 2022-07-11 DIAGNOSIS — J47.9 BRONCHIECTASIS WITHOUT COMPLICATION (HCC): ICD-10-CM

## 2022-07-11 PROCEDURE — 1123F ACP DISCUSS/DSCN MKR DOCD: CPT | Performed by: INTERNAL MEDICINE

## 2022-07-11 PROCEDURE — 99214 OFFICE O/P EST MOD 30 MIN: CPT | Performed by: INTERNAL MEDICINE

## 2022-07-11 RX ORDER — ALBUTEROL SULFATE 90 UG/1
2 AEROSOL, METERED RESPIRATORY (INHALATION) EVERY 6 HOURS PRN
Qty: 18 G | Refills: 5 | Status: SHIPPED | OUTPATIENT
Start: 2022-07-11

## 2022-07-11 RX ORDER — PREDNISONE 10 MG/1
10 TABLET ORAL
COMMUNITY
Start: 2022-07-01

## 2022-07-11 RX ORDER — LEVOFLOXACIN 500 MG/1
500 TABLET, FILM COATED ORAL
COMMUNITY
Start: 2022-07-01

## 2022-07-11 RX ORDER — FLUTICASONE PROPIONATE AND SALMETEROL 500; 50 UG/1; UG/1
1 POWDER RESPIRATORY (INHALATION) EVERY 12 HOURS
Qty: 60 EACH | Refills: 5 | Status: SHIPPED | OUTPATIENT
Start: 2022-07-11

## 2022-07-11 RX ORDER — IPRATROPIUM BROMIDE AND ALBUTEROL SULFATE 2.5; .5 MG/3ML; MG/3ML
SOLUTION RESPIRATORY (INHALATION)
Qty: 360 ML | Refills: 5 | Status: SHIPPED | OUTPATIENT
Start: 2022-07-11

## 2022-07-11 ASSESSMENT — SLEEP AND FATIGUE QUESTIONNAIRES
HOW LIKELY ARE YOU TO NOD OFF OR FALL ASLEEP WHILE SITTING QUIETLY AFTER LUNCH WITHOUT ALCOHOL: 0
HOW LIKELY ARE YOU TO NOD OFF OR FALL ASLEEP IN A CAR, WHILE STOPPED FOR A FEW MINUTES IN TRAFFIC: 0
HOW LIKELY ARE YOU TO NOD OFF OR FALL ASLEEP WHILE WATCHING TV: 1
HOW LIKELY ARE YOU TO NOD OFF OR FALL ASLEEP WHILE SITTING INACTIVE IN A PUBLIC PLACE: 0
HOW LIKELY ARE YOU TO NOD OFF OR FALL ASLEEP WHEN YOU ARE A PASSENGER IN A CAR FOR AN HOUR WITHOUT A BREAK: 0
HOW LIKELY ARE YOU TO NOD OFF OR FALL ASLEEP WHILE SITTING AND READING: 0
NECK CIRCUMFERENCE (INCHES): 17
ESS TOTAL SCORE: 4
HOW LIKELY ARE YOU TO NOD OFF OR FALL ASLEEP WHILE SITTING AND TALKING TO SOMEONE: 0
HOW LIKELY ARE YOU TO NOD OFF OR FALL ASLEEP WHILE LYING DOWN TO REST IN THE AFTERNOON WHEN CIRCUMSTANCES PERMIT: 3

## 2022-07-11 NOTE — PROGRESS NOTES
[facility] Pul[facility]                                                            Outpatient Follow Up Note      CHIEF COMPLAINT: Follow-up AIDE/COPD         HPI  Patient is using CPAP every night, 7-8 hrs/night. Using humidifier. The pressure is well tolerated. The mask is comfortable- full face mask. No significant daytime sleepiness. No nodding off when driving. Bedtime is 11:30 pm and rise time is 8 am. Sleep onset is 15-20 minutes. ESS is 4. Completed Levaquin and prednisone by PCP   Doing better   White sputum  No hemoptysis   Uses Neb 2-3 times/day   No smoking                 Past Medical History:   Diagnosis Date    Bronchiectasis (HCC)     Bronchitis chronic     [facility]phy[facility] [address] [address]     Hypertension     Insomnia     AIDE (obstructive sleep apnea)     Pneumonia        Past Surgical History:        Procedure Laterality Date    COLONOSCOPY  03/30/2017    Normal    MOUTH SURGERY Bilateral        Allergies:  has No Known Allergies. Social History:    TOBACCO:   reports that he quit smoking about 18 years ago. His smoking use included cigarettes. He has a 52.50 pack-year smoking history. He has never used smokeless tobacco.  ETOH:   reports no history of alcohol use.       Family History:       Problem Relation Age of Onset    Emphysema Father     Heart Failure Father     Diabetes Father     Hypertension Father     Asthma Neg Hx     Cancer Neg Hx        Current Medications:    Current Outpatient Medications:     levoFLOXacin (LEVAQUIN) 500 MG tablet, Take 500 mg by mouth, Disp: , Rfl:     predniSONE (DELTASONE) 10 MG tablet, Take 10 mg by mouth, Disp: , Rfl:     albuterol sulfate  (90 Base) MCG/ACT inhaler, Inhale 2 puffs into the lungs every 6 hours as needed for Wheezing or Shortness of Breath, Disp: 54 g, Rfl: 2    fluticasone-salmeterol (ADVAIR DISKUS) 250-50 MCG/DOSE AEPB, Inhale 1 puff into the lungs every 12 hours, Disp: 60 each, Rfl: 5    ipratropium-albuterol (DUONEB) 0.5-2.5 (3) MG/3ML SOLN nebulizer solution, INHALE 3 MLS INTO THE LUNGS EVERY 6 HOURS AS NEEDED FOR SHORTNESS OF BREATH (DYSPNEA OR WHEEZES), Disp: 360 mL, Rfl: 5    guaiFENesin (MUCINEX) 600 MG extended release tablet, Take 1 tablet by mouth 2 times daily, Disp: 60 tablet, Rfl: 6    Respiratory Therapy Supplies (NEBULIZER COMPRESSOR) KIT, 1 kit by Does not apply route once for 1 dose Please supply patient with nebulizer compressor E 0570 + Nebulizer kit  1 x per 6 mo  Patient uses Duoneb in nebulizer  Q 6 hours  NPI: Dr Priscilla Gonzalez, [id number] [facility], Disp: 1 kit, Rfl: 0    SYNTHROID 137 MCG tablet, Take 137 mcg by mouth Daily , Disp: , Rfl:     Nebulizers (COMPRESSOR/NEBULIZER) MISC, by Does not apply route. DX COPD Aylin, Disp: 1 each, Rfl: 0    pravastatin (PRAVACHOL) 40 MG tablet, Take 40 mg by mouth daily. , Disp: , Rfl:     potassium chloride (KLOR-CON) 10 MEQ CR tablet, Take 10 mEq by mouth daily. , Disp: , Rfl:     Misc. Devices (ACAPELLA) MISC, by Does not apply route 4 times daily. (Patient not taking: Reported on 7/11/2022), Disp: 1 each, Rfl: 0    triamterene-hydrochlorothiazide (MAXZIDE-25) 37.5-25 MG per tablet, Take 1 tablet by mouth daily. (Patient not taking: Reported on 7/11/2022), Disp: , Rfl:         Objective:   Blood pressure 122/78, pulse 79, height 5' 9\" (1.753 m), weight 227 lb 6.4 oz (103.1 kg), SpO2 90 %.' on 4LPM   Gen: No distress. Obese. BMI of 33.58  Eyes: PERRL. No sclera icterus. No conjunctival injection. ENT: No discharge. Pharynx clear. Mallampati class IV. Neck: Trachea midline. No obvious mass. Neck circumference 17\"  Resp: No accessory muscle use. No crackles. No wheezes. No rhonchi. No dullness on percussion. Good air entry. CV: Regular rate. Regular rhythm. No murmur or rub. No edema. GI: Non-tender. Non-distended. No hernia. Skin: Warm and dry. No nodule on exposed extremities. Lymph: No cervical LAD. No supraclavicular LAD. M/S: No cyanosis. No joint deformity. No clubbing. Neuro: Awake. Alert. Moves all four extremities. Psych: Oriented x 3. No anxiety. DATA reviewed by me:   PFTs 08/29/2012 FEV1 2.06L(58%) TLC 8.07L(115%) DLCO 13.56 (44%) 6MW 840 F LO2 84%     6-minute walk 10/1/2019 1 L at rest and 6 L on exertion    CT chest 5/26/2020   0.9 x 0.5 cm partially solid nodular opacity in the left upper lobe which  appears new. Solid component measures 5 mm. A few nodules in the left lower  lobe measuring up to 4 mm, unchanged. Potentially Significant Incidentals (LungRADS Category S): Emphysema. Pulmonary incidentals:  Mild bronchial wall thickening in the mid and lower  lungs. Other incidentals:  None      CT chest 11/27/2020  Regression of KARIME nodule  No findings worrisome for malignancy  Severe emphysema  No acute abnormalities    CT chest 11/3/2021  Stable solid subcentimeter nodules bilaterally      PSG AHI 8.3    Overnight pulse oximeter 9/26/2019 on CPAP 8 cm H2O with significant desaturation 6 hours and 56 minutes below 88%          CPAP data 02/13-03/14 2018 reviewed by me. Uses 8-9  hrs/night with 100% compliance and AHI of 1.2. CPAP 8 cmH2O   CPAP data 08/28-09/26 2018 reviewed by me. Uses 8-9  hrs/night with 100% compliance and AHI of 1.3. CPAP 8 cmH2O   CPAP data 08/27-09/25 2019 reviewed by me. Uses 4-5  hrs/night with 60% compliance and AHI of 0.9. CPAP 8 cmH2O   CPAP data 11/11-12/10 2020 reviewed by me. Uses 8-9  hrs/night with 100% compliance and AHI of 1.3 . CPAP 8 cmH2O   CPAP data 08/16-09/14 2021 reviewed by me. Uses 7-8  hrs/night with 100% compliance and AHI of 1.6. CPAP 8 cmH2O   CPAP data 12/06-01/04 2022 reviewed by me. Uses 8-9  hrs/night with 100% compliance and AHI of 1.6. CPAP 8 cmH2O   CPAP data 06/06-07/05 2022 reviewed by me. Uses 6-7  hrs/night with 100% compliance and AHI of 1. CPAP 8 cmH2O       Assessment:       · Mild AIDE. CPAP 8 cmH2O with 2LPM O2. Full face mask. Refusing O2 with CPAP. Optimal compliance and efficacy upon review today. · Moderate COPD  · Chronic hypoxemic respiratory failure- 1 L at rest, 6 L exertion, 2 L at night with CPAP  · Bronchiectasis  · New KARIME 9 mm nodule on CT 5/26/2020. Regressed on repeat CT 11/27/2020. Stable nodules on repeat CT 11/3/2021  · 45 pack year smoking- Quit 2004. Plan:       Order for CPAP supplies  Continue CPAP 6-8 hrs at night and during naps. Replacement of mask, tubing, head straps every 3-6 months or sooner if damaged. Follow up CPAP compliance and pressure adjustment if needed  Sleep hygiene  Avoid sedatives, alcohol and caffeinated drinks at bed time. No driving motorized vehicles or operating heavy machinery while fatigue, drowsy or sleepy. Weight loss is also recommended as a long-term intervention. Continue Advair 250/50 BID and DuoNeb QID and Albuterol 2 puffs Q4-6 hrs PRN  Acapella and Mucinex  Continue O2 1 L at rest and 6LPM on exertion- Advised to titrate O2 using her pulse oximeter- target O2 sat 90-92%.    Patient is up to date with Covid shot, pneumococcal vaccine and influenza vaccine  Follow up in 6 month

## 2023-01-09 ENCOUNTER — TELEPHONE (OUTPATIENT)
Dept: PULMONOLOGY | Age: 68
End: 2023-01-09

## 2023-01-09 NOTE — TELEPHONE ENCOUNTER
Patient cancelled appointment on 1/12/23 with Dr. Yakov Roche for 6 month COPD/AIDE. Reason: pt can't make it    Patient did reschedule appointment. Appointment rescheduled for 3/22/23. Last OV 7/11/22:    Assessment:       Mild AIDE. CPAP 8 cmH2O with 2LPM O2. Full face mask. Refusing O2 with CPAP. Optimal compliance and efficacy upon review today. Moderate COPD  Chronic hypoxemic respiratory failure- 1 L at rest, 6 L exertion, 2 L at night with CPAP  Bronchiectasis  New KARIME 9 mm nodule on CT 5/26/2020. Regressed on repeat CT 11/27/2020. Stable nodules on repeat CT 11/3/2021  45 pack year smoking- Quit 2004. Plan:       Order for CPAP supplies  Continue CPAP 6-8 hrs at night and during naps. Replacement of mask, tubing, head straps every 3-6 months or sooner if damaged. Follow up CPAP compliance and pressure adjustment if needed  Sleep hygiene  Avoid sedatives, alcohol and caffeinated drinks at bed time. No driving motorized vehicles or operating heavy machinery while fatigue, drowsy or sleepy. Weight loss is also recommended as a long-term intervention. Continue Advair 250/50 BID and DuoNeb QID and Albuterol 2 puffs Q4-6 hrs PRN  Acapella and Mucinex  Continue O2 1 L at rest and 6LPM on exertion- Advised to titrate O2 using her pulse oximeter- target O2 sat 90-92%.    Patient is up to date with Covid shot, pneumococcal vaccine and influenza vaccine  Follow up in 6 month

## 2023-01-31 DIAGNOSIS — J44.9 COPD, MODERATE (HCC): Primary | ICD-10-CM

## 2023-01-31 RX ORDER — IPRATROPIUM BROMIDE AND ALBUTEROL SULFATE 2.5; .5 MG/3ML; MG/3ML
SOLUTION RESPIRATORY (INHALATION)
Qty: 360 ML | Refills: 5 | Status: SHIPPED | OUTPATIENT
Start: 2023-01-31

## 2023-03-22 ENCOUNTER — OFFICE VISIT (OUTPATIENT)
Dept: PULMONOLOGY | Age: 68
End: 2023-03-22
Payer: MEDICARE

## 2023-03-22 VITALS
BODY MASS INDEX: 34.3 KG/M2 | HEIGHT: 69 IN | SYSTOLIC BLOOD PRESSURE: 136 MMHG | WEIGHT: 231.6 LBS | DIASTOLIC BLOOD PRESSURE: 80 MMHG | OXYGEN SATURATION: 92 % | HEART RATE: 83 BPM

## 2023-03-22 DIAGNOSIS — J44.9 COPD, MODERATE (HCC): ICD-10-CM

## 2023-03-22 DIAGNOSIS — G47.33 MILD OBSTRUCTIVE SLEEP APNEA: Primary | ICD-10-CM

## 2023-03-22 DIAGNOSIS — J47.9 BRONCHIECTASIS WITHOUT COMPLICATION (HCC): ICD-10-CM

## 2023-03-22 PROCEDURE — 1123F ACP DISCUSS/DSCN MKR DOCD: CPT | Performed by: INTERNAL MEDICINE

## 2023-03-22 PROCEDURE — 99214 OFFICE O/P EST MOD 30 MIN: CPT | Performed by: INTERNAL MEDICINE

## 2023-03-22 RX ORDER — LEVOTHYROXINE SODIUM 0.15 MG/1
150 TABLET ORAL DAILY
COMMUNITY
Start: 2023-02-27

## 2023-03-22 ASSESSMENT — SLEEP AND FATIGUE QUESTIONNAIRES
HOW LIKELY ARE YOU TO NOD OFF OR FALL ASLEEP WHILE SITTING AND TALKING TO SOMEONE: 0
HOW LIKELY ARE YOU TO NOD OFF OR FALL ASLEEP WHILE LYING DOWN TO REST IN THE AFTERNOON WHEN CIRCUMSTANCES PERMIT: 2
HOW LIKELY ARE YOU TO NOD OFF OR FALL ASLEEP WHILE SITTING AND READING: 0
HOW LIKELY ARE YOU TO NOD OFF OR FALL ASLEEP WHILE WATCHING TV: 2
HOW LIKELY ARE YOU TO NOD OFF OR FALL ASLEEP WHILE SITTING INACTIVE IN A PUBLIC PLACE: 0
HOW LIKELY ARE YOU TO NOD OFF OR FALL ASLEEP IN A CAR, WHILE STOPPED FOR A FEW MINUTES IN TRAFFIC: 0
ESS TOTAL SCORE: 5
HOW LIKELY ARE YOU TO NOD OFF OR FALL ASLEEP WHEN YOU ARE A PASSENGER IN A CAR FOR AN HOUR WITHOUT A BREAK: 0
NECK CIRCUMFERENCE (INCHES): 18
HOW LIKELY ARE YOU TO NOD OFF OR FALL ASLEEP WHILE SITTING QUIETLY AFTER LUNCH WITHOUT ALCOHOL: 1

## 2023-03-22 NOTE — PROGRESS NOTES
(ADVAIR DISKUS) 500-50 MCG/ACT AEPB diskus inhaler, Inhale 1 puff into the lungs every 12 hours, Disp: 60 each, Rfl: 5    albuterol sulfate HFA (PROVENTIL;VENTOLIN;PROAIR) 108 (90 Base) MCG/ACT inhaler, Inhale 2 puffs into the lungs every 6 hours as needed for Wheezing or Shortness of Breath, Disp: 18 g, Rfl: 5    guaiFENesin (MUCINEX) 600 MG extended release tablet, Take 1 tablet by mouth 2 times daily, Disp: 60 tablet, Rfl: 6    SYNTHROID 137 MCG tablet, Take 137 mcg by mouth Daily , Disp: , Rfl:     Nebulizers (COMPRESSOR/NEBULIZER) MISC, by Does not apply route. DX COPD Aylin, Disp: 1 each, Rfl: 0    pravastatin (PRAVACHOL) 40 MG tablet, Take 40 mg by mouth daily. , Disp: , Rfl:     potassium chloride (KLOR-CON) 10 MEQ CR tablet, Take 10 mEq by mouth daily. , Disp: , Rfl:     Respiratory Therapy Supplies (NEBULIZER COMPRESSOR) KIT, 1 kit by Does not apply route once for 1 dose Please supply patient with nebulizer compressor E 0570 + Nebulizer kit  1 x per 6 mo  Patient uses Duoneb in nebulizer  Q 6 hours  NPI: Dr Dennise Alvarado, [id number] [facility], Disp: 1 kit, Rfl: 0    Misc. Devices (ACAPELLA) MISC, by Does not apply route 4 times daily. (Patient not taking: No sig reported), Disp: 1 each, Rfl: 0    triamterene-hydrochlorothiazide (MAXZIDE-25) 37.5-25 MG per tablet, Take 1 tablet by mouth daily. (Patient not taking: No sig reported), Disp: , Rfl:         Objective:   Blood pressure 136/80, pulse 83, height 5' 9\" (1.753 m), weight 231 lb 9.6 oz (105.1 kg), SpO2 92 %.' on 4LPM   Gen: No distress. Obese. BMI of 34.20  Eyes: PERRL. No sclera icterus. No conjunctival injection. ENT: No discharge. Pharynx clear. Mallampati class IV. Neck: Trachea midline. No obvious mass. Neck circumference 18\"  Resp: No accessory muscle use. No crackles. No wheezes. No rhonchi. No dullness on percussion. Good air entry. CV: Regular rate. Regular rhythm. No murmur or rub. No edema. GI: Non-tender. Non-distended.  No

## 2023-05-23 ENCOUNTER — TELEPHONE (OUTPATIENT)
Dept: PULMONOLOGY | Age: 68
End: 2023-05-23

## 2023-05-23 DIAGNOSIS — J44.9 COPD, MODERATE (HCC): Primary | ICD-10-CM

## 2023-05-23 NOTE — TELEPHONE ENCOUNTER
Pt called stating that Evangelina from [facility] informed him that he has to have testing done on room air to prove that he needs oxygen. Pt will need a 6MW and follow up visit for oxygen recert. Okay to schedule?

## 2023-05-23 NOTE — TELEPHONE ENCOUNTER
Pt called stating [facility] in [place] called telling him to contact us to have us send the most recent oxygen status from the recent visit.    Fax #[phone]

## 2023-05-24 NOTE — TELEPHONE ENCOUNTER
Pt was notified he needs 6MW and that he needs to schedule that and then call us back to schedule follow up with Dr. Iglesia Walsh.

## 2023-06-01 ENCOUNTER — HOSPITAL ENCOUNTER (OUTPATIENT)
Dept: PULMONOLOGY | Age: 68
Discharge: HOME | End: 2023-06-01
Payer: MEDICARE

## 2023-06-01 DIAGNOSIS — J44.9 COPD, MODERATE (HCC): ICD-10-CM

## 2023-06-01 PROCEDURE — 94618 PULMONARY STRESS TESTING: CPT

## 2023-10-02 ENCOUNTER — OFFICE VISIT (OUTPATIENT)
Dept: PULMONOLOGY | Age: 68
End: 2023-10-02
Payer: MEDICARE

## 2023-10-02 VITALS
RESPIRATION RATE: 16 BRPM | BODY MASS INDEX: 34.51 KG/M2 | SYSTOLIC BLOOD PRESSURE: 130 MMHG | OXYGEN SATURATION: 93 % | WEIGHT: 233 LBS | HEIGHT: 69 IN | DIASTOLIC BLOOD PRESSURE: 72 MMHG | HEART RATE: 83 BPM

## 2023-10-02 DIAGNOSIS — G47.33 MILD OBSTRUCTIVE SLEEP APNEA: Primary | ICD-10-CM

## 2023-10-02 DIAGNOSIS — J44.9 COPD, MODERATE (HCC): ICD-10-CM

## 2023-10-02 PROCEDURE — 1123F ACP DISCUSS/DSCN MKR DOCD: CPT | Performed by: INTERNAL MEDICINE

## 2023-10-02 PROCEDURE — G8417 CALC BMI ABV UP PARAM F/U: HCPCS | Performed by: INTERNAL MEDICINE

## 2023-10-02 PROCEDURE — 3023F SPIROM DOC REV: CPT | Performed by: INTERNAL MEDICINE

## 2023-10-02 PROCEDURE — G8484 FLU IMMUNIZE NO ADMIN: HCPCS | Performed by: INTERNAL MEDICINE

## 2023-10-02 PROCEDURE — 3017F COLORECTAL CA SCREEN DOC REV: CPT | Performed by: INTERNAL MEDICINE

## 2023-10-02 PROCEDURE — 99214 OFFICE O/P EST MOD 30 MIN: CPT | Performed by: INTERNAL MEDICINE

## 2023-10-02 PROCEDURE — G8427 DOCREV CUR MEDS BY ELIG CLIN: HCPCS | Performed by: INTERNAL MEDICINE

## 2023-10-02 PROCEDURE — 1036F TOBACCO NON-USER: CPT | Performed by: INTERNAL MEDICINE

## 2023-10-02 RX ORDER — LEVOTHYROXINE SODIUM 175 UG/1
175 TABLET ORAL DAILY
COMMUNITY
Start: 2023-08-18

## 2023-10-02 ASSESSMENT — SLEEP AND FATIGUE QUESTIONNAIRES
HOW LIKELY ARE YOU TO NOD OFF OR FALL ASLEEP WHILE SITTING INACTIVE IN A PUBLIC PLACE: 0
HOW LIKELY ARE YOU TO NOD OFF OR FALL ASLEEP WHILE WATCHING TV: 2
HOW LIKELY ARE YOU TO NOD OFF OR FALL ASLEEP WHILE SITTING QUIETLY AFTER LUNCH WITHOUT ALCOHOL: 0
HOW LIKELY ARE YOU TO NOD OFF OR FALL ASLEEP WHILE SITTING AND READING: 0
HOW LIKELY ARE YOU TO NOD OFF OR FALL ASLEEP WHILE LYING DOWN TO REST IN THE AFTERNOON WHEN CIRCUMSTANCES PERMIT: 3
HOW LIKELY ARE YOU TO NOD OFF OR FALL ASLEEP WHEN YOU ARE A PASSENGER IN A CAR FOR AN HOUR WITHOUT A BREAK: 0
NECK CIRCUMFERENCE (INCHES): 18
HOW LIKELY ARE YOU TO NOD OFF OR FALL ASLEEP WHILE SITTING AND TALKING TO SOMEONE: 0
HOW LIKELY ARE YOU TO NOD OFF OR FALL ASLEEP IN A CAR, WHILE STOPPED FOR A FEW MINUTES IN TRAFFIC: 0
ESS TOTAL SCORE: 5

## 2023-10-02 NOTE — PROGRESS NOTES
[facility] Pulmonary, Critical Care and Sleep Specialists                                                            Outpatient Follow Up Note      CHIEF COMPLAINT: Follow-up AIDE/COPD        HPI  Doing good  Not much cough or sputum  No hemoptysis   Uses Neb 3-4 times/day   No smoking   Compliant with his O2 and benefiting from it   Patient is using CPAP every night, 6-7 hrs/night. Sleeps better with it. Using humidifier. The pressure is well tolerated. The mask is comfortable- full face mask. No significant daytime sleepiness. No nodding off when driving. Bedtime is 11:30 pm and rise time is 7:30-8 am. Sleep onset is 15-20 minutes. ESS is 5. Past Medical History:   Diagnosis Date    Bronchiectasis ([address])     Bronchitis chronic     Emphysema of lung (HCC)     Hypertension     Insomnia     AIDE (obstructive sleep apnea)     Pneumonia        Past Surgical History:        Procedure Laterality Date    COLONOSCOPY  03/30/2017    Normal    MOUTH SURGERY Bilateral        Allergies:  has No Known Allergies. Social History:    TOBACCO:   reports that he quit smoking about 19 years ago. His smoking use included cigarettes. He has a 52.50 pack-year smoking history. He has never used smokeless tobacco.  ETOH:   reports no history of alcohol use.       Family History:       Problem Relation Age of Onset    Emphysema Father     Heart Failure Father     Diabetes Father     Hypertension Father     Asthma Neg Hx     Cancer Neg Hx        Current Medications:    Current Outpatient Medications:     levothyroxine (SYNTHROID) 175 MCG tablet, Take 1 tablet by mouth daily, Disp: , Rfl:     triamcinolone (KENALOG) 0.1 % cream, APPLY THIN COAT TO AFFECTED AREA TWICE DAILY, Disp: , Rfl:     amLODIPine (NORVASC) 5 MG tablet, Take 1 tablet by mouth daily, Disp: , Rfl:     ipratropium-albuterol (DUONEB) 0.5-2.5 (3) MG/3ML SOLN nebulizer solution, INHALE 3 MLS INTO THE LUNGS EVERY 6 HOURS AS NEEDED FOR

## 2023-10-20 ENCOUNTER — TELEPHONE (OUTPATIENT)
Dept: PULMONOLOGY | Age: 68
End: 2023-10-20

## 2023-10-20 NOTE — TELEPHONE ENCOUNTER
Pt is switching from [facility] to [facility] due to insurance change Pt would like a script for his concentrator o2 tanks to be sent [facility]     Sent script for o2 and ov notes to [facility]

## 2023-10-26 DIAGNOSIS — J44.9 COPD, MODERATE (HCC): ICD-10-CM

## 2023-10-26 RX ORDER — IPRATROPIUM BROMIDE AND ALBUTEROL SULFATE 2.5; .5 MG/3ML; MG/3ML
SOLUTION RESPIRATORY (INHALATION)
Qty: 360 ML | Refills: 5 | Status: SHIPPED | OUTPATIENT
Start: 2023-10-26

## 2023-10-27 DIAGNOSIS — J44.9 COPD, MODERATE (HCC): ICD-10-CM

## 2023-10-30 RX ORDER — ALBUTEROL SULFATE 90 UG/1
2 AEROSOL, METERED RESPIRATORY (INHALATION) EVERY 6 HOURS PRN
Qty: 18 EACH | Refills: 5 | Status: SHIPPED | OUTPATIENT
Start: 2023-10-30

## 2023-11-20 ENCOUNTER — TELEPHONE (OUTPATIENT)
Dept: PULMONOLOGY | Age: 68
End: 2023-11-20

## 2023-11-20 NOTE — TELEPHONE ENCOUNTER
Pt stated that he is on 6lpm of O2 Pt is going in for cataract surgery but he is wanting to know if Dr. Pamela James is ok with this considering he has to go under light anesthesia.   Please advise

## 2023-11-21 NOTE — TELEPHONE ENCOUNTER
Tiffany for local anesthesia  O2 monitoring and titrate O2 using her pulse oximeter- target O2 sat 90-92%.

## 2024-01-11 ENCOUNTER — OFFICE VISIT (OUTPATIENT)
Dept: PULMONOLOGY | Age: 69
End: 2024-01-11

## 2024-01-11 VITALS
SYSTOLIC BLOOD PRESSURE: 138 MMHG | HEIGHT: 69 IN | RESPIRATION RATE: 24 BRPM | OXYGEN SATURATION: 90 % | BODY MASS INDEX: 34.07 KG/M2 | WEIGHT: 230 LBS | DIASTOLIC BLOOD PRESSURE: 78 MMHG | HEART RATE: 115 BPM

## 2024-01-11 DIAGNOSIS — J96.11 CHRONIC HYPOXEMIC RESPIRATORY FAILURE (HCC): ICD-10-CM

## 2024-01-11 DIAGNOSIS — G47.33 OSA (OBSTRUCTIVE SLEEP APNEA): Primary | ICD-10-CM

## 2024-01-11 DIAGNOSIS — R91.1 LUNG NODULE: ICD-10-CM

## 2024-01-11 DIAGNOSIS — J44.9 MODERATE COPD (CHRONIC OBSTRUCTIVE PULMONARY DISEASE) (HCC): ICD-10-CM

## 2024-01-11 RX ORDER — FLUTICASONE PROPIONATE AND SALMETEROL 500; 50 UG/1; UG/1
1 POWDER RESPIRATORY (INHALATION) EVERY 12 HOURS
Qty: 60 EACH | Refills: 5 | Status: SHIPPED | OUTPATIENT
Start: 2024-01-11

## 2024-01-11 ASSESSMENT — SLEEP AND FATIGUE QUESTIONNAIRES
HOW LIKELY ARE YOU TO NOD OFF OR FALL ASLEEP WHILE LYING DOWN TO REST IN THE AFTERNOON WHEN CIRCUMSTANCES PERMIT: 3
HOW LIKELY ARE YOU TO NOD OFF OR FALL ASLEEP WHILE SITTING INACTIVE IN A PUBLIC PLACE: 0
HOW LIKELY ARE YOU TO NOD OFF OR FALL ASLEEP WHILE SITTING AND READING: 2
HOW LIKELY ARE YOU TO NOD OFF OR FALL ASLEEP WHILE SITTING QUIETLY AFTER LUNCH WITHOUT ALCOHOL: 0
ESS TOTAL SCORE: 7
HOW LIKELY ARE YOU TO NOD OFF OR FALL ASLEEP WHILE WATCHING TV: 2
HOW LIKELY ARE YOU TO NOD OFF OR FALL ASLEEP IN A CAR, WHILE STOPPED FOR A FEW MINUTES IN TRAFFIC: 0
HOW LIKELY ARE YOU TO NOD OFF OR FALL ASLEEP WHILE SITTING AND TALKING TO SOMEONE: 0
NECK CIRCUMFERENCE (INCHES): 18
HOW LIKELY ARE YOU TO NOD OFF OR FALL ASLEEP WHEN YOU ARE A PASSENGER IN A CAR FOR AN HOUR WITHOUT A BREAK: 0

## 2024-01-11 NOTE — PROGRESS NOTES
CPAP data 08/16-09/14 2021 reviewed by me. Uses 7-8  hrs/night with 100% compliance and AHI of 1.6. CPAP 8 cmH2O   CPAP data 12/06-01/04 2022 reviewed by me. Uses 8-9  hrs/night with 100% compliance and AHI of 1.6. CPAP 8 cmH2O   CPAP data 06/06-07/05 2022 reviewed by me. Uses 6-7  hrs/night with 100% compliance and AHI of 1. CPAP 8 cmH2O   CPAP data 02/19-03/20 2023 reviewed by me. Uses 7-8  hrs/night with 100% compliance and AHI of 1.4. CPAP 8 cmH2O   CPAP data 09/01-09/30 2023 reviewed by me. Uses 6-7  hrs/night with 100% compliance and AHI of 1.3. CPAP 8 cmH2O   CPAP data 12/05-01/03 2024 reviewed by me. Uses 7-8  hrs/night with 100% compliance and AHI of 1.1. CPAP 8 cmH2O     Assessment:       Mild AIDE. CPAP 8 cmH2O with 2-3 LPM O2. Full face mask.  Declined BiPAP titration. Optimal compliance and efficacy upon review today.   Moderate COPD  Chronic hypoxemic respiratory failure- 2 L at rest, 6 L exertion, 2 L at night with CPAP  Bronchiectasis  New KARIME 9 mm nodule on CT 5/26/2020.  Regressed on repeat CT 11/27/2020.  Stable nodules on repeat CT 11/3/2021  45 pack year smoking- Quit 2004.         Plan:       Order for CPAP supplies  Continue CPAP 6-8 hrs at night and during naps.  Replacement of mask, tubing, head straps every 3-6 months or sooner if damaged.   Follow up CPAP compliance and pressure adjustment if needed  Sleep hygiene  Avoid sedatives, alcohol and caffeinated drinks at bed time.  Advised to use Advair 250/50 BID - refills   Continue DuoNeb QID   Continue Albuterol 2 puffs Q4-6 hrs PRN  CT chest, low dose protocol follow up nodules   Order for portable concentrator   Acapella and Mucinex  O2 2-3 L at rest and 6LPM on exertion- Advised to titrate O2 using her pulse oximeter- target O2 sat 90-92%.   Advised to continue with smoking cessation.  Patient is up to date with Covid shot, pneumococcal vaccine and influenza vaccine  Follow up in 3 month

## 2024-01-23 ENCOUNTER — TELEPHONE (OUTPATIENT)
Dept: PULMONOLOGY | Age: 69
End: 2024-01-23

## 2024-01-23 NOTE — TELEPHONE ENCOUNTER
Spoke with Lisa at [facility] whom states that order was rec'd but not processes.  She is sending to intake team to process the order.  Spoke with pt and informed with verbal understanding.  Asked pt to call office back if he has not heard from [facility] within a few days.  Pt verbalized understanding.

## 2024-01-26 ENCOUNTER — TELEPHONE (OUTPATIENT)
Dept: PULMONOLOGY | Age: 69
End: 2024-01-26

## 2024-01-26 DIAGNOSIS — R09.02 HYPOXIA: Primary | ICD-10-CM

## 2024-01-26 NOTE — TELEPHONE ENCOUNTER
Received an email from Lisa at [facility] that said the following:     We have an order for a POC for Harjit Lau [birth year] and the order is from Dr. Toney he wrote for POC at 6L we only have POC that go up to 5L.  Just wanted to make you aware that we couldnt supply this for the patient.

## 2024-01-30 NOTE — TELEPHONE ENCOUNTER
Per Michael with [employer], if pt is requiring a setting of 6 and it will not be temporary due to exacerbation they will likely not be able to support pts needs for portable oxygen.  Pt will have to continue on portable oxygen tanks for now.

## 2024-03-26 ENCOUNTER — HOSPITAL ENCOUNTER (OUTPATIENT)
Dept: CT IMAGING | Age: 69
Discharge: HOME | End: 2024-03-26
Payer: MEDICARE

## 2024-03-26 DIAGNOSIS — R91.1 LUNG NODULE: ICD-10-CM

## 2024-03-26 PROCEDURE — 71250 CT THORAX DX C-: CPT

## 2024-05-22 ENCOUNTER — TELEPHONE (OUTPATIENT)
Dept: PULMONOLOGY | Age: 69
End: 2024-05-22

## 2024-05-22 ENCOUNTER — OFFICE VISIT (OUTPATIENT)
Dept: PULMONOLOGY | Age: 69
End: 2024-05-22
Payer: MEDICARE

## 2024-05-22 VITALS
DIASTOLIC BLOOD PRESSURE: 60 MMHG | WEIGHT: 225 LBS | HEIGHT: 69 IN | RESPIRATION RATE: 20 BRPM | BODY MASS INDEX: 33.33 KG/M2 | OXYGEN SATURATION: 90 % | SYSTOLIC BLOOD PRESSURE: 132 MMHG | HEART RATE: 90 BPM

## 2024-05-22 DIAGNOSIS — J43.9 PULMONARY EMPHYSEMA, UNSPECIFIED EMPHYSEMA TYPE (HCC): ICD-10-CM

## 2024-05-22 DIAGNOSIS — J96.11 CHRONIC HYPOXEMIC RESPIRATORY FAILURE (HCC): ICD-10-CM

## 2024-05-22 DIAGNOSIS — G47.33 OSA (OBSTRUCTIVE SLEEP APNEA): Primary | ICD-10-CM

## 2024-05-22 PROCEDURE — G8427 DOCREV CUR MEDS BY ELIG CLIN: HCPCS | Performed by: INTERNAL MEDICINE

## 2024-05-22 PROCEDURE — 1036F TOBACCO NON-USER: CPT | Performed by: INTERNAL MEDICINE

## 2024-05-22 PROCEDURE — 99214 OFFICE O/P EST MOD 30 MIN: CPT | Performed by: INTERNAL MEDICINE

## 2024-05-22 PROCEDURE — 1123F ACP DISCUSS/DSCN MKR DOCD: CPT | Performed by: INTERNAL MEDICINE

## 2024-05-22 PROCEDURE — G8417 CALC BMI ABV UP PARAM F/U: HCPCS | Performed by: INTERNAL MEDICINE

## 2024-05-22 PROCEDURE — 3023F SPIROM DOC REV: CPT | Performed by: INTERNAL MEDICINE

## 2024-05-22 PROCEDURE — 3017F COLORECTAL CA SCREEN DOC REV: CPT | Performed by: INTERNAL MEDICINE

## 2024-05-22 ASSESSMENT — SLEEP AND FATIGUE QUESTIONNAIRES
HOW LIKELY ARE YOU TO NOD OFF OR FALL ASLEEP WHILE SITTING AND TALKING TO SOMEONE: WOULD NEVER DOZE
HOW LIKELY ARE YOU TO NOD OFF OR FALL ASLEEP WHILE SITTING INACTIVE IN A PUBLIC PLACE: WOULD NEVER DOZE
ESS TOTAL SCORE: 5
HOW LIKELY ARE YOU TO NOD OFF OR FALL ASLEEP WHILE LYING DOWN TO REST IN THE AFTERNOON WHEN CIRCUMSTANCES PERMIT: HIGH CHANCE OF DOZING
HOW LIKELY ARE YOU TO NOD OFF OR FALL ASLEEP WHILE WATCHING TV: MODERATE CHANCE OF DOZING
HOW LIKELY ARE YOU TO NOD OFF OR FALL ASLEEP WHEN YOU ARE A PASSENGER IN A CAR FOR AN HOUR WITHOUT A BREAK: WOULD NEVER DOZE
HOW LIKELY ARE YOU TO NOD OFF OR FALL ASLEEP WHILE SITTING AND READING: WOULD NEVER DOZE
NECK CIRCUMFERENCE (INCHES): 16.5
HOW LIKELY ARE YOU TO NOD OFF OR FALL ASLEEP WHILE SITTING QUIETLY AFTER LUNCH WITHOUT ALCOHOL: WOULD NEVER DOZE
HOW LIKELY ARE YOU TO NOD OFF OR FALL ASLEEP IN A CAR, WHILE STOPPED FOR A FEW MINUTES IN TRAFFIC: WOULD NEVER DOZE

## 2024-05-22 NOTE — PROGRESS NOTES
[facility]                                                            Outpatient Follow Up Note      CHIEF COMPLAINT: Follow-up COPD/AIDE        HPI  Doing good  Little cough with white sputum  No hemoptysis    Uses Neb 3-4 times/day  No smoking   Compliant with O2 and feels better/benefiting when using it   Patient is using CPAP every night, 6-7 hrs/night.  Mask and pressure are good. No nodding off when driving. Using humidifier. The mask is comfortable- full face mask. No significant daytime sleepiness.No nodding off when driving. Bedtime is 11:30 pm and rise time is 7:30-8 am. Sleep onset is 15-20 minutes. ESS is 5.                 Past Medical History:   Diagnosis Date    Bronchiectasis (HCC)     Bronchitis chronic     Emphysema of lung (HCC)     Hypertension     Insomnia     AIDE (obstructive sleep apnea)     Pneumonia        Past Surgical History:        Procedure Laterality Date    COLONOSCOPY  03/30/2017    Normal    MOUTH SURGERY Bilateral        Allergies:  has No Known Allergies.  Social History:    TOBACCO:   reports that he quit smoking about 20 years ago. His smoking use included cigarettes. He started smoking about 55 years ago. He has a 52.5 pack-year smoking history. He has never used smokeless tobacco.  ETOH:   reports no history of alcohol use.      Family History:       Problem Relation Age of Onset    Emphysema Father     Heart Failure Father     Diabetes Father     Hypertension Father     Asthma Neg Hx     Cancer Neg Hx        Current Medications:    Current Outpatient Medications:     fluticasone-salmeterol (ADVAIR DISKUS) 500-50 MCG/ACT AEPB diskus inhaler, Inhale 1 puff into the lungs in the morning and 1 puff in the evening., Disp: 60 each, Rfl: 5    albuterol sulfate HFA (PROVENTIL;VENTOLIN;PROAIR) 108 (90 Base) MCG/ACT inhaler, INHALE 2 PUFFS INTO THE LUNGS EVERY 6 HOURS AS NEEDED FOR WHEEZING OR SHORTNESS OF

## 2024-05-22 NOTE — TELEPHONE ENCOUNTER
Faxed heated tubing order, full face mask order, CR, and ov notes to [facility] at [phone] via [employer].

## 2024-06-05 ENCOUNTER — TELEPHONE (OUTPATIENT)
Dept: PULMONOLOGY | Age: 69
End: 2024-06-05

## 2024-06-05 NOTE — TELEPHONE ENCOUNTER
Patient said that the pulmonary Rehab called and try to set him up for the rehab. Patient call in to say that he will not be able to do the Pilmonary Rehab three time a week. He said that he lives on a fixed income and that would just cost him to much money in gas.

## 2024-07-29 DIAGNOSIS — J44.9 COPD, MODERATE (HCC): ICD-10-CM

## 2024-07-29 RX ORDER — IPRATROPIUM BROMIDE AND ALBUTEROL SULFATE 2.5; .5 MG/3ML; MG/3ML
SOLUTION RESPIRATORY (INHALATION)
Qty: 360 ML | Refills: 5 | Status: SHIPPED | OUTPATIENT
Start: 2024-07-29

## 2024-07-30 RX ORDER — FLUTICASONE PROPIONATE AND SALMETEROL 500; 50 UG/1; UG/1
POWDER RESPIRATORY (INHALATION)
Qty: 60 EACH | Refills: 5 | Status: SHIPPED | OUTPATIENT
Start: 2024-07-30

## 2024-08-08 ENCOUNTER — HOSPITAL ENCOUNTER (OUTPATIENT)
Dept: PULMONOLOGY | Age: 69
Discharge: HOME | End: 2024-08-08
Payer: MEDICARE

## 2024-08-08 DIAGNOSIS — J43.9 PULMONARY EMPHYSEMA, UNSPECIFIED EMPHYSEMA TYPE (HCC): ICD-10-CM

## 2024-08-08 LAB
DLCO %PRED: 21 %
DLCO PRED: NORMAL
DLCO/VA %PRED: NORMAL
DLCO/VA PRED: NORMAL
DLCO/VA: NORMAL
DLCO: NORMAL
EXPIRATORY TIME-POST: NORMAL
EXPIRATORY TIME: NORMAL
FEF 25-75 %CHNG: NORMAL
FEF 25-75 POST %PRED: NORMAL
FEF 25-75% %PRED-PRE: NORMAL
FEF 25-75% PRED: NORMAL
FEF 25-75-POST: NORMAL
FEF 25-75-PRE: NORMAL
FEV1 %PRED-POST: 40 %
FEV1 %PRED-PRE: 37 %
FEV1 PRED: NORMAL
FEV1-POST: NORMAL
FEV1-PRE: NORMAL
FEV1/FVC %PRED-POST: NORMAL
FEV1/FVC %PRED-PRE: NORMAL
FEV1/FVC PRED: NORMAL
FEV1/FVC-POST: 46 %
FEV1/FVC-PRE: 45 %
FVC %PRED-POST: NORMAL
FVC %PRED-PRE: NORMAL
FVC PRED: NORMAL
FVC-POST: NORMAL
FVC-PRE: NORMAL
GAW %PRED: NORMAL
GAW PRED: NORMAL
GAW: NORMAL
IC PRE %PRED: NORMAL
IC PRED: NORMAL
IC: NORMAL
MEP: NORMAL
MIP: NORMAL
MVV %PRED-PRE: NORMAL
MVV PRED: NORMAL
MVV-PRE: NORMAL
PEF %PRED-POST: NORMAL
PEF %PRED-PRE: NORMAL
PEF PRED: NORMAL
PEF%CHNG: NORMAL
PEF-POST: NORMAL
PEF-PRE: NORMAL
RAW %PRED: NORMAL
RAW PRED: NORMAL
RAW: NORMAL
RV PRE %PRED: NORMAL
RV PRED: NORMAL
RV: NORMAL
SVC %PRED: NORMAL
SVC PRED: NORMAL
SVC: NORMAL
TLC PRE %PRED: 150 %
TLC PRED: NORMAL
TLC: NORMAL
VA %PRED: NORMAL
VA PRED: NORMAL
VA: NORMAL
VTG %PRED: NORMAL
VTG PRED: NORMAL
VTG: NORMAL

## 2024-08-08 PROCEDURE — 94618 PULMONARY STRESS TESTING: CPT

## 2024-08-08 PROCEDURE — 94726 PLETHYSMOGRAPHY LUNG VOLUMES: CPT

## 2024-08-08 PROCEDURE — 94060 EVALUATION OF WHEEZING: CPT

## 2024-08-08 PROCEDURE — 6370000000 HC RX 637 (ALT 250 FOR IP): Performed by: INTERNAL MEDICINE

## 2024-08-08 PROCEDURE — 94729 DIFFUSING CAPACITY: CPT

## 2024-08-08 PROCEDURE — 94760 N-INVAS EAR/PLS OXIMETRY 1: CPT

## 2024-08-08 RX ORDER — ALBUTEROL SULFATE 90 UG/1
4 AEROSOL, METERED RESPIRATORY (INHALATION) ONCE
Status: COMPLETED | OUTPATIENT
Start: 2024-08-08 | End: 2024-08-08

## 2024-08-08 RX ADMIN — ALBUTEROL SULFATE 4 PUFF: 90 AEROSOL, METERED RESPIRATORY (INHALATION) at 11:50

## 2024-08-08 ASSESSMENT — PULMONARY FUNCTION TESTS
FEV1/FVC_POST: 46
FEV1_PERCENT_PREDICTED_PRE: 37
FEV1/FVC_PRE: 45
FEV1_PERCENT_PREDICTED_POST: 40

## 2024-08-15 ENCOUNTER — OFFICE VISIT (OUTPATIENT)
Dept: PULMONOLOGY | Age: 69
End: 2024-08-15
Payer: MEDICARE

## 2024-08-15 VITALS
DIASTOLIC BLOOD PRESSURE: 60 MMHG | RESPIRATION RATE: 19 BRPM | OXYGEN SATURATION: 91 % | HEART RATE: 81 BPM | WEIGHT: 223.4 LBS | SYSTOLIC BLOOD PRESSURE: 134 MMHG | BODY MASS INDEX: 33.09 KG/M2 | HEIGHT: 69 IN

## 2024-08-15 DIAGNOSIS — J43.9 PULMONARY EMPHYSEMA, UNSPECIFIED EMPHYSEMA TYPE (HCC): ICD-10-CM

## 2024-08-15 DIAGNOSIS — J96.11 CHRONIC HYPOXEMIC RESPIRATORY FAILURE (HCC): ICD-10-CM

## 2024-08-15 DIAGNOSIS — G47.33 OSA (OBSTRUCTIVE SLEEP APNEA): Primary | ICD-10-CM

## 2024-08-15 PROCEDURE — 1123F ACP DISCUSS/DSCN MKR DOCD: CPT | Performed by: INTERNAL MEDICINE

## 2024-08-15 PROCEDURE — G8427 DOCREV CUR MEDS BY ELIG CLIN: HCPCS | Performed by: INTERNAL MEDICINE

## 2024-08-15 PROCEDURE — G8417 CALC BMI ABV UP PARAM F/U: HCPCS | Performed by: INTERNAL MEDICINE

## 2024-08-15 PROCEDURE — 99214 OFFICE O/P EST MOD 30 MIN: CPT | Performed by: INTERNAL MEDICINE

## 2024-08-15 PROCEDURE — 3023F SPIROM DOC REV: CPT | Performed by: INTERNAL MEDICINE

## 2024-08-15 PROCEDURE — 3017F COLORECTAL CA SCREEN DOC REV: CPT | Performed by: INTERNAL MEDICINE

## 2024-08-15 PROCEDURE — 1036F TOBACCO NON-USER: CPT | Performed by: INTERNAL MEDICINE

## 2024-08-15 RX ORDER — DOXYCYCLINE HYCLATE 100 MG
100 TABLET ORAL 2 TIMES DAILY
Qty: 10 TABLET | Refills: 0 | Status: SHIPPED | OUTPATIENT
Start: 2024-08-15 | End: 2024-08-20

## 2024-08-15 RX ORDER — PREDNISONE 10 MG/1
TABLET ORAL
Qty: 30 TABLET | Refills: 0 | Status: SHIPPED | OUTPATIENT
Start: 2024-08-15

## 2024-08-15 RX ORDER — METRONIDAZOLE 7.5 MG/G
GEL TOPICAL 2 TIMES DAILY
COMMUNITY

## 2024-08-15 ASSESSMENT — SLEEP AND FATIGUE QUESTIONNAIRES
HOW LIKELY ARE YOU TO NOD OFF OR FALL ASLEEP WHILE LYING DOWN TO REST IN THE AFTERNOON WHEN CIRCUMSTANCES PERMIT: HIGH CHANCE OF DOZING
HOW LIKELY ARE YOU TO NOD OFF OR FALL ASLEEP WHILE SITTING INACTIVE IN A PUBLIC PLACE: WOULD NEVER DOZE
HOW LIKELY ARE YOU TO NOD OFF OR FALL ASLEEP IN A CAR, WHILE STOPPED FOR A FEW MINUTES IN TRAFFIC: WOULD NEVER DOZE
ESS TOTAL SCORE: 7
HOW LIKELY ARE YOU TO NOD OFF OR FALL ASLEEP WHEN YOU ARE A PASSENGER IN A CAR FOR AN HOUR WITHOUT A BREAK: WOULD NEVER DOZE
HOW LIKELY ARE YOU TO NOD OFF OR FALL ASLEEP WHILE SITTING AND READING: WOULD NEVER DOZE
HOW LIKELY ARE YOU TO NOD OFF OR FALL ASLEEP WHILE SITTING AND TALKING TO SOMEONE: WOULD NEVER DOZE
HOW LIKELY ARE YOU TO NOD OFF OR FALL ASLEEP WHILE WATCHING TV: MODERATE CHANCE OF DOZING
HOW LIKELY ARE YOU TO NOD OFF OR FALL ASLEEP WHILE SITTING QUIETLY AFTER LUNCH WITHOUT ALCOHOL: MODERATE CHANCE OF DOZING

## 2024-08-15 ASSESSMENT — PULMONARY FUNCTION TESTS
FEV1/FVC_PRE: 45
FEV1_PERCENT_PREDICTED_POST: 40
FEV1/FVC_POST: 46
FEV1_PERCENT_PREDICTED_PRE: 37

## 2024-08-15 NOTE — PROGRESS NOTES
Advair 250/50 BID  Continue DuoNeb QID PRN  Prednisone taper and Doxycycline 100 mg BID x 5 days for COPD AE self management if needed.   Acapella and Mucinex  Continue O2 2-8LPM rest and exertion- Advised to titrate O2 using her pulse oximeter- target O2 sat 90-92%.   D/W patient lung transplant and he is not interested   Advised to continue with smoking cessation.  Patient is up to date with Covid shot, pneumococcal vaccine and influenza vaccine  Follow up in 3-6 month

## 2024-10-30 DIAGNOSIS — J44.9 COPD, MODERATE (HCC): ICD-10-CM

## 2024-10-31 RX ORDER — ALBUTEROL SULFATE 90 UG/1
INHALANT RESPIRATORY (INHALATION)
Qty: 18 EACH | Refills: 5 | Status: SHIPPED | OUTPATIENT
Start: 2024-10-31

## 2025-01-26 DIAGNOSIS — J44.9 COPD, MODERATE (HCC): Primary | ICD-10-CM

## 2025-01-27 RX ORDER — FLUTICASONE PROPIONATE AND SALMETEROL 500; 50 UG/1; UG/1
POWDER RESPIRATORY (INHALATION)
Qty: 60 EACH | Refills: 5 | Status: SHIPPED | OUTPATIENT
Start: 2025-01-27

## 2025-02-02 DIAGNOSIS — J44.9 COPD, MODERATE (HCC): ICD-10-CM

## 2025-02-04 RX ORDER — IPRATROPIUM BROMIDE AND ALBUTEROL SULFATE 2.5; .5 MG/3ML; MG/3ML
SOLUTION RESPIRATORY (INHALATION)
Qty: 360 ML | Refills: 5 | Status: SHIPPED | OUTPATIENT
Start: 2025-02-04

## 2025-02-24 ENCOUNTER — TELEPHONE (OUTPATIENT)
Dept: PULMONOLOGY | Age: 70
End: 2025-02-24

## 2025-02-24 NOTE — TELEPHONE ENCOUNTER
Received a fax stating that the  Portable Oxygen and Oxygen concentrator has been denied. I called [facility] to see what was needed  to  get him qualified. He stated since he switched insurancesd they need a new order from this year and face to face notes faxed over.  His next follow up is  on 3/6/25 with Dr. Toney.

## 2025-03-06 ENCOUNTER — OFFICE VISIT (OUTPATIENT)
Dept: PULMONOLOGY | Age: 70
End: 2025-03-06
Payer: MEDICARE

## 2025-03-06 VITALS
RESPIRATION RATE: 20 BRPM | DIASTOLIC BLOOD PRESSURE: 74 MMHG | WEIGHT: 234.8 LBS | SYSTOLIC BLOOD PRESSURE: 122 MMHG | BODY MASS INDEX: 34.78 KG/M2 | HEIGHT: 69 IN | HEART RATE: 80 BPM | OXYGEN SATURATION: 95 %

## 2025-03-06 DIAGNOSIS — G47.33 OSA (OBSTRUCTIVE SLEEP APNEA): ICD-10-CM

## 2025-03-06 DIAGNOSIS — J96.11 CHRONIC HYPOXEMIC RESPIRATORY FAILURE (HCC): ICD-10-CM

## 2025-03-06 DIAGNOSIS — J44.9 COPD, MODERATE (HCC): Primary | ICD-10-CM

## 2025-03-06 PROCEDURE — 1159F MED LIST DOCD IN RCRD: CPT | Performed by: INTERNAL MEDICINE

## 2025-03-06 PROCEDURE — 1123F ACP DISCUSS/DSCN MKR DOCD: CPT | Performed by: INTERNAL MEDICINE

## 2025-03-06 PROCEDURE — 99214 OFFICE O/P EST MOD 30 MIN: CPT | Performed by: INTERNAL MEDICINE

## 2025-03-06 PROCEDURE — G2211 COMPLEX E/M VISIT ADD ON: HCPCS | Performed by: INTERNAL MEDICINE

## 2025-03-06 RX ORDER — DOXYCYCLINE HYCLATE 100 MG
100 TABLET ORAL 2 TIMES DAILY
Qty: 10 TABLET | Refills: 0 | Status: SHIPPED | OUTPATIENT
Start: 2025-03-06 | End: 2025-03-11

## 2025-03-06 RX ORDER — PREDNISONE 10 MG/1
TABLET ORAL
Qty: 30 TABLET | Refills: 0 | Status: SHIPPED | OUTPATIENT
Start: 2025-03-06

## 2025-03-06 ASSESSMENT — SLEEP AND FATIGUE QUESTIONNAIRES
ESS TOTAL SCORE: 3
HOW LIKELY ARE YOU TO NOD OFF OR FALL ASLEEP WHILE SITTING QUIETLY AFTER LUNCH WITHOUT ALCOHOL: WOULD NEVER DOZE
HOW LIKELY ARE YOU TO NOD OFF OR FALL ASLEEP WHILE WATCHING TV: SLIGHT CHANCE OF DOZING
HOW LIKELY ARE YOU TO NOD OFF OR FALL ASLEEP IN A CAR, WHILE STOPPED FOR A FEW MINUTES IN TRAFFIC: WOULD NEVER DOZE
HOW LIKELY ARE YOU TO NOD OFF OR FALL ASLEEP WHILE SITTING AND READING: WOULD NEVER DOZE
HOW LIKELY ARE YOU TO NOD OFF OR FALL ASLEEP WHILE LYING DOWN TO REST IN THE AFTERNOON WHEN CIRCUMSTANCES PERMIT: MODERATE CHANCE OF DOZING
HOW LIKELY ARE YOU TO NOD OFF OR FALL ASLEEP WHEN YOU ARE A PASSENGER IN A CAR FOR AN HOUR WITHOUT A BREAK: WOULD NEVER DOZE
HOW LIKELY ARE YOU TO NOD OFF OR FALL ASLEEP WHILE SITTING INACTIVE IN A PUBLIC PLACE: WOULD NEVER DOZE

## 2025-03-06 NOTE — PROGRESS NOTES
[facility]                                                            Outpatient Follow Up Note      CHIEF COMPLAINT: Follow-up  COPD/AIDE        HPI  Not doing too bad   Cough with white sputum  No hemoptysis  Uses Neb 3-4 times/day  No smoking   Compliant with inhaled bronchodilators.   Compliant with O2 and feels better/benefiting when using it   Patient is using CPAP every night, 6-7 hrs/night.  Doing well. The mask is comfortable- full face mask. No significant daytime sleepiness.No nodding off when driving. Bedtime is 11:30 pm and rise time is 8 am. Sleep onset is fewminutes. ESS is 3.                 Past Medical History:   Diagnosis Date    Bronchiectasis (HCC)     Bronchitis chronic     Emphysema of lung (HCC)     Hypertension     Insomnia     AIDE (obstructive sleep apnea)     Pneumonia        Past Surgical History:        Procedure Laterality Date    COLONOSCOPY  03/30/2017    Normal    MOUTH SURGERY Bilateral        Allergies:  has No Known Allergies.  Social History:    TOBACCO:   reports that he quit smoking about 21 years ago. His smoking use included cigarettes. He started smoking about 56 years ago. He has a 52.5 pack-year smoking history. He has never used smokeless tobacco.  ETOH:   reports no history of alcohol use.      Family History:       Problem Relation Age of Onset    Emphysema Father     Heart Failure Father     Diabetes Father     Hypertension Father     Asthma Neg Hx     Cancer Neg Hx        Current Medications:    Current Outpatient Medications:     ipratropium 0.5 mg-albuterol 2.5 mg (DUONEB) 0.5-2.5 (3) MG/3ML SOLN nebulizer solution, INHALE 3 MLS INTO THE LUNGS EVERY 6 HOURS AS NEEDED FOR SHORTNESS OF BREATH (DYSPNEA OR WHEEZES), Disp: 360 mL, Rfl: 5    fluticasone-salmeterol (ADVAIR) 500-50 MCG/ACT AEPB diskus inhaler, INHALE 1 PUFF INTO THE LUNGS IN THE MORNING AND IN THE EVENING, Disp: 60 each, Rfl: 5